# Patient Record
Sex: FEMALE | Race: WHITE | NOT HISPANIC OR LATINO | Employment: FULL TIME | URBAN - METROPOLITAN AREA
[De-identification: names, ages, dates, MRNs, and addresses within clinical notes are randomized per-mention and may not be internally consistent; named-entity substitution may affect disease eponyms.]

---

## 2023-07-20 ENCOUNTER — HOSPITAL ENCOUNTER (EMERGENCY)
Facility: HOSPITAL | Age: 43
Discharge: HOME | End: 2023-07-21
Attending: EMERGENCY MEDICINE
Payer: COMMERCIAL

## 2023-07-20 ENCOUNTER — APPOINTMENT (EMERGENCY)
Dept: CT IMAGING | Facility: HOSPITAL | Age: 43
End: 2023-07-20
Payer: COMMERCIAL

## 2023-07-20 DIAGNOSIS — N13.30 BILATERAL HYDRONEPHROSIS: Primary | ICD-10-CM

## 2023-07-20 DIAGNOSIS — N20.1 RIGHT URETERAL STONE: ICD-10-CM

## 2023-07-20 DIAGNOSIS — A41.9 SEPSIS (HCC): ICD-10-CM

## 2023-07-20 DIAGNOSIS — N12 PYELONEPHRITIS: ICD-10-CM

## 2023-07-20 LAB
ALBUMIN SERPL BCP-MCNC: 4.2 G/DL (ref 3.5–5)
ALP SERPL-CCNC: 51 U/L (ref 34–104)
ALT SERPL W P-5'-P-CCNC: 35 U/L (ref 7–52)
ANION GAP SERPL CALCULATED.3IONS-SCNC: 7 MMOL/L
AST SERPL W P-5'-P-CCNC: 42 U/L (ref 13–39)
BACTERIA UR QL AUTO: ABNORMAL /HPF
BASOPHILS # BLD AUTO: 0.04 THOUSANDS/ÂΜL (ref 0–0.1)
BASOPHILS NFR BLD AUTO: 0 % (ref 0–1)
BILIRUB DIRECT SERPL-MCNC: 0.23 MG/DL (ref 0–0.2)
BILIRUB SERPL-MCNC: 1.1 MG/DL (ref 0.2–1)
BILIRUB UR QL STRIP: NEGATIVE
BUDDING YEAST: PRESENT
BUN SERPL-MCNC: 9 MG/DL (ref 5–25)
CALCIUM SERPL-MCNC: 9.8 MG/DL (ref 8.4–10.2)
CHLORIDE SERPL-SCNC: 102 MMOL/L (ref 96–108)
CLARITY UR: ABNORMAL
CO2 SERPL-SCNC: 24 MMOL/L (ref 21–32)
COLOR UR: YELLOW
CREAT SERPL-MCNC: 0.8 MG/DL (ref 0.6–1.3)
EOSINOPHIL # BLD AUTO: 0.01 THOUSAND/ÂΜL (ref 0–0.61)
EOSINOPHIL NFR BLD AUTO: 0 % (ref 0–6)
ERYTHROCYTE [DISTWIDTH] IN BLOOD BY AUTOMATED COUNT: 12 % (ref 11.6–15.1)
GFR SERPL CREATININE-BSD FRML MDRD: 90 ML/MIN/1.73SQ M
GLUCOSE SERPL-MCNC: 126 MG/DL (ref 65–140)
GLUCOSE UR STRIP-MCNC: NEGATIVE MG/DL
HCG SERPL QL: NEGATIVE
HCT VFR BLD AUTO: 40.3 % (ref 34.8–46.1)
HGB BLD-MCNC: 13.9 G/DL (ref 11.5–15.4)
HGB UR QL STRIP.AUTO: ABNORMAL
IMM GRANULOCYTES # BLD AUTO: 0.11 THOUSAND/UL (ref 0–0.2)
IMM GRANULOCYTES NFR BLD AUTO: 1 % (ref 0–2)
KETONES UR STRIP-MCNC: NEGATIVE MG/DL
LACTATE SERPL-SCNC: 0.6 MMOL/L (ref 0.5–2)
LEUKOCYTE ESTERASE UR QL STRIP: ABNORMAL
LIPASE SERPL-CCNC: 7 U/L (ref 11–82)
LYMPHOCYTES # BLD AUTO: 0.68 THOUSANDS/ÂΜL (ref 0.6–4.47)
LYMPHOCYTES NFR BLD AUTO: 4 % (ref 14–44)
MAGNESIUM SERPL-MCNC: 1.6 MG/DL (ref 1.9–2.7)
MCH RBC QN AUTO: 30.8 PG (ref 26.8–34.3)
MCHC RBC AUTO-ENTMCNC: 34.5 G/DL (ref 31.4–37.4)
MCV RBC AUTO: 89 FL (ref 82–98)
MONOCYTES # BLD AUTO: 1.51 THOUSAND/ÂΜL (ref 0.17–1.22)
MONOCYTES NFR BLD AUTO: 9 % (ref 4–12)
MUCOUS THREADS UR QL AUTO: ABNORMAL
NEUTROPHILS # BLD AUTO: 14.76 THOUSANDS/ÂΜL (ref 1.85–7.62)
NEUTS SEG NFR BLD AUTO: 86 % (ref 43–75)
NITRITE UR QL STRIP: POSITIVE
NON-SQ EPI CELLS URNS QL MICRO: ABNORMAL /HPF
NRBC BLD AUTO-RTO: 0 /100 WBCS
PH UR STRIP.AUTO: 6 [PH]
PLATELET # BLD AUTO: 229 THOUSANDS/UL (ref 149–390)
PMV BLD AUTO: 8.9 FL (ref 8.9–12.7)
POTASSIUM SERPL-SCNC: 3.8 MMOL/L (ref 3.5–5.3)
PROCALCITONIN SERPL-MCNC: 0.23 NG/ML
PROT SERPL-MCNC: 7.3 G/DL (ref 6.4–8.4)
PROT UR STRIP-MCNC: ABNORMAL MG/DL
RBC # BLD AUTO: 4.52 MILLION/UL (ref 3.81–5.12)
RBC #/AREA URNS AUTO: ABNORMAL /HPF
SODIUM SERPL-SCNC: 133 MMOL/L (ref 135–147)
SP GR UR STRIP.AUTO: 1.01 (ref 1–1.03)
UROBILINOGEN UR STRIP-ACNC: <2 MG/DL
WBC # BLD AUTO: 17.11 THOUSAND/UL (ref 4.31–10.16)
WBC #/AREA URNS AUTO: ABNORMAL /HPF

## 2023-07-20 PROCEDURE — 96375 TX/PRO/DX INJ NEW DRUG ADDON: CPT

## 2023-07-20 PROCEDURE — 87040 BLOOD CULTURE FOR BACTERIA: CPT | Performed by: EMERGENCY MEDICINE

## 2023-07-20 PROCEDURE — 80076 HEPATIC FUNCTION PANEL: CPT | Performed by: EMERGENCY MEDICINE

## 2023-07-20 PROCEDURE — 84145 PROCALCITONIN (PCT): CPT | Performed by: EMERGENCY MEDICINE

## 2023-07-20 PROCEDURE — G1004 CDSM NDSC: HCPCS

## 2023-07-20 PROCEDURE — 96368 THER/DIAG CONCURRENT INF: CPT

## 2023-07-20 PROCEDURE — 74177 CT ABD & PELVIS W/CONTRAST: CPT

## 2023-07-20 PROCEDURE — 83690 ASSAY OF LIPASE: CPT | Performed by: EMERGENCY MEDICINE

## 2023-07-20 PROCEDURE — 36415 COLL VENOUS BLD VENIPUNCTURE: CPT | Performed by: EMERGENCY MEDICINE

## 2023-07-20 PROCEDURE — 80048 BASIC METABOLIC PNL TOTAL CA: CPT | Performed by: EMERGENCY MEDICINE

## 2023-07-20 PROCEDURE — 81001 URINALYSIS AUTO W/SCOPE: CPT | Performed by: EMERGENCY MEDICINE

## 2023-07-20 PROCEDURE — 96361 HYDRATE IV INFUSION ADD-ON: CPT

## 2023-07-20 PROCEDURE — 83735 ASSAY OF MAGNESIUM: CPT | Performed by: EMERGENCY MEDICINE

## 2023-07-20 PROCEDURE — 96365 THER/PROPH/DIAG IV INF INIT: CPT

## 2023-07-20 PROCEDURE — 87186 SC STD MICRODIL/AGAR DIL: CPT | Performed by: EMERGENCY MEDICINE

## 2023-07-20 PROCEDURE — 87077 CULTURE AEROBIC IDENTIFY: CPT | Performed by: EMERGENCY MEDICINE

## 2023-07-20 PROCEDURE — 99284 EMERGENCY DEPT VISIT MOD MDM: CPT

## 2023-07-20 PROCEDURE — 84703 CHORIONIC GONADOTROPIN ASSAY: CPT | Performed by: EMERGENCY MEDICINE

## 2023-07-20 PROCEDURE — 85025 COMPLETE CBC W/AUTO DIFF WBC: CPT | Performed by: EMERGENCY MEDICINE

## 2023-07-20 PROCEDURE — 83605 ASSAY OF LACTIC ACID: CPT | Performed by: EMERGENCY MEDICINE

## 2023-07-20 PROCEDURE — 87086 URINE CULTURE/COLONY COUNT: CPT | Performed by: EMERGENCY MEDICINE

## 2023-07-20 RX ORDER — ACETAMINOPHEN 325 MG/1
650 TABLET ORAL ONCE
Status: COMPLETED | OUTPATIENT
Start: 2023-07-20 | End: 2023-07-20

## 2023-07-20 RX ORDER — ONDANSETRON 2 MG/ML
4 INJECTION INTRAMUSCULAR; INTRAVENOUS ONCE
Status: COMPLETED | OUTPATIENT
Start: 2023-07-20 | End: 2023-07-20

## 2023-07-20 RX ORDER — KETOROLAC TROMETHAMINE 30 MG/ML
15 INJECTION, SOLUTION INTRAMUSCULAR; INTRAVENOUS ONCE
Status: COMPLETED | OUTPATIENT
Start: 2023-07-20 | End: 2023-07-20

## 2023-07-20 RX ORDER — MAGNESIUM SULFATE HEPTAHYDRATE 40 MG/ML
2 INJECTION, SOLUTION INTRAVENOUS ONCE
Status: COMPLETED | OUTPATIENT
Start: 2023-07-20 | End: 2023-07-21

## 2023-07-20 RX ORDER — CEFTRIAXONE 1 G/50ML
1000 INJECTION, SOLUTION INTRAVENOUS ONCE
Status: COMPLETED | OUTPATIENT
Start: 2023-07-20 | End: 2023-07-20

## 2023-07-20 RX ADMIN — SODIUM CHLORIDE 1000 ML: 0.9 INJECTION, SOLUTION INTRAVENOUS at 22:32

## 2023-07-20 RX ADMIN — ACETAMINOPHEN 650 MG: 325 TABLET ORAL at 23:00

## 2023-07-20 RX ADMIN — CEFTRIAXONE 1000 MG: 1 INJECTION, SOLUTION INTRAVENOUS at 23:12

## 2023-07-20 RX ADMIN — KETOROLAC TROMETHAMINE 15 MG: 30 INJECTION, SOLUTION INTRAMUSCULAR at 23:03

## 2023-07-20 RX ADMIN — IOHEXOL 100 ML: 350 INJECTION, SOLUTION INTRAVENOUS at 23:55

## 2023-07-20 RX ADMIN — ONDANSETRON 4 MG: 2 INJECTION INTRAMUSCULAR; INTRAVENOUS at 23:00

## 2023-07-20 RX ADMIN — MAGNESIUM SULFATE HEPTAHYDRATE 2 G: 40 INJECTION, SOLUTION INTRAVENOUS at 23:12

## 2023-07-21 ENCOUNTER — APPOINTMENT (OUTPATIENT)
Dept: RADIOLOGY | Facility: HOSPITAL | Age: 43
DRG: 854 | End: 2023-07-21
Payer: COMMERCIAL

## 2023-07-21 ENCOUNTER — TELEPHONE (OUTPATIENT)
Dept: UROLOGY | Facility: CLINIC | Age: 43
End: 2023-07-21

## 2023-07-21 ENCOUNTER — PREP FOR PROCEDURE (OUTPATIENT)
Dept: UROLOGY | Facility: CLINIC | Age: 43
End: 2023-07-21

## 2023-07-21 ENCOUNTER — ANESTHESIA EVENT (OUTPATIENT)
Dept: PERIOP | Facility: HOSPITAL | Age: 43
DRG: 854 | End: 2023-07-21
Payer: COMMERCIAL

## 2023-07-21 ENCOUNTER — ANESTHESIA (OUTPATIENT)
Dept: PERIOP | Facility: HOSPITAL | Age: 43
DRG: 854 | End: 2023-07-21
Payer: COMMERCIAL

## 2023-07-21 ENCOUNTER — HOSPITAL ENCOUNTER (INPATIENT)
Facility: HOSPITAL | Age: 43
LOS: 2 days | Discharge: HOME | DRG: 854 | End: 2023-07-23
Attending: INTERNAL MEDICINE | Admitting: STUDENT IN AN ORGANIZED HEALTH CARE EDUCATION/TRAINING PROGRAM
Payer: COMMERCIAL

## 2023-07-21 VITALS
OXYGEN SATURATION: 96 % | RESPIRATION RATE: 18 BRPM | BODY MASS INDEX: 31.15 KG/M2 | HEART RATE: 82 BPM | HEIGHT: 61 IN | WEIGHT: 165 LBS | DIASTOLIC BLOOD PRESSURE: 58 MMHG | TEMPERATURE: 98.8 F | SYSTOLIC BLOOD PRESSURE: 125 MMHG

## 2023-07-21 DIAGNOSIS — N12 PYELONEPHRITIS: Primary | ICD-10-CM

## 2023-07-21 DIAGNOSIS — N20.1 URETERAL STONE: Primary | ICD-10-CM

## 2023-07-21 DIAGNOSIS — A41.9 SEPSIS (HCC): ICD-10-CM

## 2023-07-21 DIAGNOSIS — N20.1 URETERAL CALCULUS: ICD-10-CM

## 2023-07-21 LAB
ANION GAP SERPL CALCULATED.3IONS-SCNC: 7 MMOL/L
BASOPHILS # BLD AUTO: 0.03 THOUSANDS/ÂΜL (ref 0–0.1)
BASOPHILS NFR BLD AUTO: 0 % (ref 0–1)
BUN SERPL-MCNC: 8 MG/DL (ref 5–25)
CALCIUM SERPL-MCNC: 8 MG/DL (ref 8.4–10.2)
CHLORIDE SERPL-SCNC: 109 MMOL/L (ref 96–108)
CO2 SERPL-SCNC: 22 MMOL/L (ref 21–32)
CREAT SERPL-MCNC: 0.66 MG/DL (ref 0.6–1.3)
EOSINOPHIL # BLD AUTO: 0.01 THOUSAND/ÂΜL (ref 0–0.61)
EOSINOPHIL NFR BLD AUTO: 0 % (ref 0–6)
ERYTHROCYTE [DISTWIDTH] IN BLOOD BY AUTOMATED COUNT: 12.2 % (ref 11.6–15.1)
GFR SERPL CREATININE-BSD FRML MDRD: 108 ML/MIN/1.73SQ M
GLUCOSE P FAST SERPL-MCNC: 118 MG/DL (ref 65–99)
GLUCOSE SERPL-MCNC: 118 MG/DL (ref 65–140)
HCT VFR BLD AUTO: 36.8 % (ref 34.8–46.1)
HGB BLD-MCNC: 12.4 G/DL (ref 11.5–15.4)
IMM GRANULOCYTES # BLD AUTO: 0.07 THOUSAND/UL (ref 0–0.2)
IMM GRANULOCYTES NFR BLD AUTO: 1 % (ref 0–2)
LYMPHOCYTES # BLD AUTO: 0.43 THOUSANDS/ÂΜL (ref 0.6–4.47)
LYMPHOCYTES NFR BLD AUTO: 3 % (ref 14–44)
MCH RBC QN AUTO: 30.6 PG (ref 26.8–34.3)
MCHC RBC AUTO-ENTMCNC: 33.7 G/DL (ref 31.4–37.4)
MCV RBC AUTO: 91 FL (ref 82–98)
MONOCYTES # BLD AUTO: 0.58 THOUSAND/ÂΜL (ref 0.17–1.22)
MONOCYTES NFR BLD AUTO: 4 % (ref 4–12)
NEUTROPHILS # BLD AUTO: 12.29 THOUSANDS/ÂΜL (ref 1.85–7.62)
NEUTS SEG NFR BLD AUTO: 92 % (ref 43–75)
NRBC BLD AUTO-RTO: 0 /100 WBCS
PLATELET # BLD AUTO: 166 THOUSANDS/UL (ref 149–390)
PMV BLD AUTO: 8.7 FL (ref 8.9–12.7)
POTASSIUM SERPL-SCNC: 3.5 MMOL/L (ref 3.5–5.3)
RBC # BLD AUTO: 4.05 MILLION/UL (ref 3.81–5.12)
SODIUM SERPL-SCNC: 138 MMOL/L (ref 135–147)
WBC # BLD AUTO: 13.41 THOUSAND/UL (ref 4.31–10.16)

## 2023-07-21 PROCEDURE — 85025 COMPLETE CBC W/AUTO DIFF WBC: CPT | Performed by: PHYSICIAN ASSISTANT

## 2023-07-21 PROCEDURE — 99285 EMERGENCY DEPT VISIT HI MDM: CPT | Performed by: EMERGENCY MEDICINE

## 2023-07-21 PROCEDURE — BT141ZZ FLUOROSCOPY OF KIDNEYS, URETERS AND BLADDER USING LOW OSMOLAR CONTRAST: ICD-10-PCS | Performed by: UROLOGY

## 2023-07-21 PROCEDURE — 74420 UROGRAPHY RTRGR +-KUB: CPT

## 2023-07-21 PROCEDURE — 0T788DZ DILATION OF BILATERAL URETERS WITH INTRALUMINAL DEVICE, VIA NATURAL OR ARTIFICIAL OPENING ENDOSCOPIC: ICD-10-PCS | Performed by: UROLOGY

## 2023-07-21 PROCEDURE — 80048 BASIC METABOLIC PNL TOTAL CA: CPT | Performed by: PHYSICIAN ASSISTANT

## 2023-07-21 PROCEDURE — C2617 STENT, NON-COR, TEM W/O DEL: HCPCS | Performed by: UROLOGY

## 2023-07-21 PROCEDURE — 99222 1ST HOSP IP/OBS MODERATE 55: CPT | Performed by: NURSE PRACTITIONER

## 2023-07-21 PROCEDURE — C1769 GUIDE WIRE: HCPCS | Performed by: UROLOGY

## 2023-07-21 PROCEDURE — 52332 CYSTOSCOPY AND TREATMENT: CPT | Performed by: UROLOGY

## 2023-07-21 PROCEDURE — 99223 1ST HOSP IP/OBS HIGH 75: CPT | Performed by: UROLOGY

## 2023-07-21 PROCEDURE — 96366 THER/PROPH/DIAG IV INF ADDON: CPT

## 2023-07-21 PROCEDURE — 96361 HYDRATE IV INFUSION ADD-ON: CPT

## 2023-07-21 PROCEDURE — C1758 CATHETER, URETERAL: HCPCS | Performed by: UROLOGY

## 2023-07-21 DEVICE — INLAY OPTIMA URETERAL STENT W/O GUIDEWIRE
Type: IMPLANTABLE DEVICE | Site: URETER | Status: FUNCTIONAL
Brand: BARD® INLAY OPTIMA® URETERAL STENT

## 2023-07-21 RX ORDER — SODIUM CHLORIDE, SODIUM LACTATE, POTASSIUM CHLORIDE, CALCIUM CHLORIDE 600; 310; 30; 20 MG/100ML; MG/100ML; MG/100ML; MG/100ML
INJECTION, SOLUTION INTRAVENOUS CONTINUOUS PRN
Status: DISCONTINUED | OUTPATIENT
Start: 2023-07-21 | End: 2023-07-21

## 2023-07-21 RX ORDER — DEXAMETHASONE SODIUM PHOSPHATE 10 MG/ML
INJECTION, SOLUTION INTRAMUSCULAR; INTRAVENOUS AS NEEDED
Status: DISCONTINUED | OUTPATIENT
Start: 2023-07-21 | End: 2023-07-21

## 2023-07-21 RX ORDER — LEVOFLOXACIN 5 MG/ML
500 INJECTION, SOLUTION INTRAVENOUS ONCE
Status: CANCELLED | OUTPATIENT
Start: 2023-07-21 | End: 2023-07-21

## 2023-07-21 RX ORDER — MIDAZOLAM HYDROCHLORIDE 2 MG/2ML
INJECTION, SOLUTION INTRAMUSCULAR; INTRAVENOUS AS NEEDED
Status: DISCONTINUED | OUTPATIENT
Start: 2023-07-21 | End: 2023-07-21

## 2023-07-21 RX ORDER — MAGNESIUM HYDROXIDE 1200 MG/15ML
LIQUID ORAL AS NEEDED
Status: DISCONTINUED | OUTPATIENT
Start: 2023-07-21 | End: 2023-07-21 | Stop reason: HOSPADM

## 2023-07-21 RX ORDER — SODIUM CHLORIDE, SODIUM LACTATE, POTASSIUM CHLORIDE, CALCIUM CHLORIDE 600; 310; 30; 20 MG/100ML; MG/100ML; MG/100ML; MG/100ML
75 INJECTION, SOLUTION INTRAVENOUS CONTINUOUS
Status: DISCONTINUED | OUTPATIENT
Start: 2023-07-21 | End: 2023-07-21

## 2023-07-21 RX ORDER — OXYCODONE HYDROCHLORIDE 5 MG/1
5 TABLET ORAL EVERY 6 HOURS PRN
Status: DISCONTINUED | OUTPATIENT
Start: 2023-07-21 | End: 2023-07-21

## 2023-07-21 RX ORDER — CEFAZOLIN SODIUM 2 G/50ML
2000 SOLUTION INTRAVENOUS ONCE
Status: COMPLETED | OUTPATIENT
Start: 2023-07-21 | End: 2023-07-21

## 2023-07-21 RX ORDER — ONDANSETRON 2 MG/ML
4 INJECTION INTRAMUSCULAR; INTRAVENOUS ONCE AS NEEDED
Status: DISCONTINUED | OUTPATIENT
Start: 2023-07-21 | End: 2023-07-21 | Stop reason: HOSPADM

## 2023-07-21 RX ORDER — ONDANSETRON 2 MG/ML
INJECTION INTRAMUSCULAR; INTRAVENOUS AS NEEDED
Status: DISCONTINUED | OUTPATIENT
Start: 2023-07-21 | End: 2023-07-21

## 2023-07-21 RX ORDER — FENTANYL CITRATE/PF 50 MCG/ML
25 SYRINGE (ML) INJECTION
Status: DISCONTINUED | OUTPATIENT
Start: 2023-07-21 | End: 2023-07-21 | Stop reason: HOSPADM

## 2023-07-21 RX ORDER — HYDROMORPHONE HCL/PF 1 MG/ML
0.5 SYRINGE (ML) INJECTION EVERY 4 HOURS PRN
Status: DISCONTINUED | OUTPATIENT
Start: 2023-07-21 | End: 2023-07-23 | Stop reason: HOSPADM

## 2023-07-21 RX ORDER — KETOROLAC TROMETHAMINE 30 MG/ML
15 INJECTION, SOLUTION INTRAMUSCULAR; INTRAVENOUS EVERY 6 HOURS PRN
Status: DISCONTINUED | OUTPATIENT
Start: 2023-07-21 | End: 2023-07-22

## 2023-07-21 RX ORDER — LIDOCAINE HYDROCHLORIDE 10 MG/ML
INJECTION, SOLUTION EPIDURAL; INFILTRATION; INTRACAUDAL; PERINEURAL AS NEEDED
Status: DISCONTINUED | OUTPATIENT
Start: 2023-07-21 | End: 2023-07-21

## 2023-07-21 RX ORDER — PROPOFOL 10 MG/ML
INJECTION, EMULSION INTRAVENOUS AS NEEDED
Status: DISCONTINUED | OUTPATIENT
Start: 2023-07-21 | End: 2023-07-21

## 2023-07-21 RX ORDER — OXYCODONE HYDROCHLORIDE 5 MG/1
5 TABLET ORAL EVERY 6 HOURS PRN
Status: DISCONTINUED | OUTPATIENT
Start: 2023-07-21 | End: 2023-07-22

## 2023-07-21 RX ORDER — SUCCINYLCHOLINE/SOD CL,ISO/PF 100 MG/5ML
SYRINGE (ML) INTRAVENOUS AS NEEDED
Status: DISCONTINUED | OUTPATIENT
Start: 2023-07-21 | End: 2023-07-21

## 2023-07-21 RX ORDER — LEVOFLOXACIN 5 MG/ML
500 INJECTION, SOLUTION INTRAVENOUS ONCE
Status: DISCONTINUED | OUTPATIENT
Start: 2023-07-21 | End: 2023-07-21

## 2023-07-21 RX ORDER — SPIRONOLACTONE 50 MG/1
1 TABLET, FILM COATED ORAL 2 TIMES DAILY
COMMUNITY
Start: 2023-06-16

## 2023-07-21 RX ORDER — OXYCODONE HYDROCHLORIDE 10 MG/1
10 TABLET ORAL EVERY 6 HOURS PRN
Status: DISCONTINUED | OUTPATIENT
Start: 2023-07-21 | End: 2023-07-21

## 2023-07-21 RX ORDER — ONDANSETRON 2 MG/ML
4 INJECTION INTRAMUSCULAR; INTRAVENOUS EVERY 6 HOURS PRN
Status: DISCONTINUED | OUTPATIENT
Start: 2023-07-21 | End: 2023-07-23 | Stop reason: HOSPADM

## 2023-07-21 RX ORDER — SODIUM CHLORIDE 9 MG/ML
75 INJECTION, SOLUTION INTRAVENOUS CONTINUOUS
Status: DISPENSED | OUTPATIENT
Start: 2023-07-21 | End: 2023-07-22

## 2023-07-21 RX ORDER — ACETAMINOPHEN 325 MG/1
650 TABLET ORAL EVERY 6 HOURS PRN
Status: DISCONTINUED | OUTPATIENT
Start: 2023-07-21 | End: 2023-07-23 | Stop reason: HOSPADM

## 2023-07-21 RX ORDER — SODIUM CHLORIDE 9 MG/ML
125 INJECTION, SOLUTION INTRAVENOUS CONTINUOUS
Status: DISCONTINUED | OUTPATIENT
Start: 2023-07-21 | End: 2023-07-21 | Stop reason: HOSPADM

## 2023-07-21 RX ORDER — HEPARIN SODIUM 5000 [USP'U]/ML
5000 INJECTION, SOLUTION INTRAVENOUS; SUBCUTANEOUS EVERY 8 HOURS SCHEDULED
Status: DISCONTINUED | OUTPATIENT
Start: 2023-07-21 | End: 2023-07-22

## 2023-07-21 RX ORDER — SODIUM CHLORIDE 9 MG/ML
125 INJECTION, SOLUTION INTRAVENOUS CONTINUOUS
Status: DISCONTINUED | OUTPATIENT
Start: 2023-07-21 | End: 2023-07-21

## 2023-07-21 RX ORDER — FENTANYL CITRATE 50 UG/ML
INJECTION, SOLUTION INTRAMUSCULAR; INTRAVENOUS AS NEEDED
Status: DISCONTINUED | OUTPATIENT
Start: 2023-07-21 | End: 2023-07-21

## 2023-07-21 RX ADMIN — SODIUM CHLORIDE 75 ML/HR: 0.9 INJECTION, SOLUTION INTRAVENOUS at 17:29

## 2023-07-21 RX ADMIN — HYDROMORPHONE HYDROCHLORIDE 0.5 MG: 1 INJECTION, SOLUTION INTRAMUSCULAR; INTRAVENOUS; SUBCUTANEOUS at 15:24

## 2023-07-21 RX ADMIN — ONDANSETRON 4 MG: 2 INJECTION INTRAMUSCULAR; INTRAVENOUS at 04:50

## 2023-07-21 RX ADMIN — DEXAMETHASONE SODIUM PHOSPHATE 10 MG: 10 INJECTION, SOLUTION INTRAMUSCULAR; INTRAVENOUS at 07:36

## 2023-07-21 RX ADMIN — SODIUM CHLORIDE 125 ML/HR: 0.9 INJECTION, SOLUTION INTRAVENOUS at 01:18

## 2023-07-21 RX ADMIN — MIDAZOLAM 2 MG: 1 INJECTION INTRAMUSCULAR; INTRAVENOUS at 07:20

## 2023-07-21 RX ADMIN — Medication 100 MG: at 07:23

## 2023-07-21 RX ADMIN — FENTANYL CITRATE 50 MCG: 50 INJECTION INTRAMUSCULAR; INTRAVENOUS at 07:23

## 2023-07-21 RX ADMIN — CEFTRIAXONE 1000 MG: 2 INJECTION, POWDER, FOR SOLUTION INTRAMUSCULAR; INTRAVENOUS at 23:03

## 2023-07-21 RX ADMIN — ONDANSETRON 4 MG: 2 INJECTION INTRAMUSCULAR; INTRAVENOUS at 07:42

## 2023-07-21 RX ADMIN — OXYCODONE HYDROCHLORIDE 5 MG: 5 TABLET ORAL at 20:34

## 2023-07-21 RX ADMIN — PROPOFOL 150 MG: 10 INJECTION, EMULSION INTRAVENOUS at 07:23

## 2023-07-21 RX ADMIN — HEPARIN SODIUM 5000 UNITS: 5000 INJECTION INTRAVENOUS; SUBCUTANEOUS at 22:58

## 2023-07-21 RX ADMIN — CEFAZOLIN SODIUM 2000 MG: 2 SOLUTION INTRAVENOUS at 11:25

## 2023-07-21 RX ADMIN — SODIUM CHLORIDE 125 ML/HR: 0.9 INJECTION, SOLUTION INTRAVENOUS at 04:32

## 2023-07-21 RX ADMIN — HEPARIN SODIUM 5000 UNITS: 5000 INJECTION INTRAVENOUS; SUBCUTANEOUS at 17:29

## 2023-07-21 RX ADMIN — SODIUM CHLORIDE 125 ML/HR: 0.9 INJECTION, SOLUTION INTRAVENOUS at 01:17

## 2023-07-21 RX ADMIN — SODIUM CHLORIDE, SODIUM LACTATE, POTASSIUM CHLORIDE, AND CALCIUM CHLORIDE: .6; .31; .03; .02 INJECTION, SOLUTION INTRAVENOUS at 07:17

## 2023-07-21 RX ADMIN — FENTANYL CITRATE 50 MCG: 50 INJECTION INTRAMUSCULAR; INTRAVENOUS at 07:45

## 2023-07-21 RX ADMIN — OXYCODONE HYDROCHLORIDE 5 MG: 5 TABLET ORAL at 10:41

## 2023-07-21 RX ADMIN — LIDOCAINE HYDROCHLORIDE 50 MG: 10 INJECTION, SOLUTION EPIDURAL; INFILTRATION; INTRACAUDAL; PERINEURAL at 07:23

## 2023-07-21 NOTE — ED NOTES
Trans at 0300 with slets  Dr salas accepting  [phone] for report     Jsoh Salter, RN  07/21/23 2017

## 2023-07-21 NOTE — OP NOTE
OPERATIVE REPORT  PATIENT NAME: Jun Cosme    :  1980  MRN: [id number]  Pt Location: AN OR ROOM 01    SURGERY DATE: 2023    Surgeon(s) and Role:     * Ian Mejia MD - Primary    Preop Diagnosis:  Bilateral hydronephrosis [N13.30]  Bilateral pyelonephritis  Right ureteral stone        Post-Op Diagnosis Codes:     * Bilateral hydronephrosis [N13.30]  Bilateral pyelonephritis  Right ureteral stone    Procedure(s):  Bilateral - CYSTOSCOPY RETROGRADE PYELOGRAM WITH INSERTION STENT URETERAL bilateral    Specimen(s):  * No specimens in log *    Estimated Blood Loss:   Minimal    Drains:  Ureteral Internal Stent Left ureter 6 Fr. (Active)   Site Assessment [facility] 23 [id number]   Number of days: 0       Ureteral Internal Stent Right ureter 6 Fr. (Active)   Site Assessment [facility] 23 4728   Number of days: 0       Anesthesia Type:   General    Operative Indications:  Patient with a history of recurrent nephrolithiasis presented to the emergency room with fevers and bilateral flank pain with CT scan showing bilateral hydronephrosis with multifocal acute pyelonephritis with obvious urothelial enhancement bilaterally with a 3 mm mid right ureteral stone and a possible punctate left ureteral stone although on my interpretation of the imaging I did not see a left-sided stone but did appreciate hydronephrosis of the collecting system and ureter as well as urothelial enhancement. Operative Findings:  Bladder had injected appearance of mucosa consistent with cystitis. Right retrograde pyelogram did not show obvious filling defect and showed mild hydroureteronephrosis. Stent placed without pus  Left side showed mild hydronephrosis without filling defect. Stent placed without pus. Complications:   None    Procedure and Technique:  The patient was brought to the operating room. Anesthesia was induced. They were moved to dorsal lithotomy position. Antibiotics were confirmed.   A time-out was performed identifying the correct patient, site, and procedure. A rigid 25 [place] cystoscope was introduced into the bladder. The urethra was unremarkable. The bladder was quickly surveyed and without any significant abnormalities. A  film was taken which did not show evidence of stone. Attention was turned to the right ureteral orifice. A 5 [place] open-end catheter was used to intubate the ureteral orifice. A gentle retrograde pyeloureterogram was performed which showed a normal-appearing ureter with mild to moderate hydronephrosis without a filling defect. A Solo wire was passed through the open-ended catheter up the ureter into the right renal pelvis under fluoroscopic guidance. The 5 [place] open-ended catheter was passed up to the collecting system and measured to be approximately 25 cm and then removed. A 6x 26stent was placed over the wire under visual guidance in the bladder and fluoroscopic guidance proximally. Once seen to be in appropriate position the wire was removed and a good coil was seen in the upper pole of the collecting system on fluoroscopy and visually in the bladder. The stent string was not left on. No pus was seen to drain after stent placement. Attention was now turned towards left side. In a similar fashion 500 and catheter was used to intubate the orifice and perform a retrograde which showed mild hydronephrosis. A wire was passed up to the collecting system and a 6x26 JJ stent was passed over the wire and after stent deployed good coil was seen in the collecting system on fluoroscopy and visually in the bladder. Pus was seen to drain from either side. The bladder was drained. This completed the case. The patient was woken from anesthesia and transferred to PACU in good condition. PLAN:  Patient will be arranged for outpatient Urology follow-up to set up definitive stone surgery once potential infection has cleared.      A qualified resident physician was not available.     Patient Disposition:  PACU         SIGNATURE: Paige Porter MD  DATE: July 21, 2023  TIME: 3:01 PM

## 2023-07-21 NOTE — ANESTHESIA POSTPROCEDURE EVALUATION
Post-Op Assessment Note    CV Status:  Stable    Pain management: adequate     Mental Status:  Awake and sleepy   PONV Controlled:  Controlled   Airway Patency:  Patent      Post Op Vitals Reviewed: Yes      Staff: CRNA         No notable events documented.     BP   104/57   Temp     Pulse 114   Resp   24   SpO2   95

## 2023-07-21 NOTE — CONSULTS
UROLOGY CONSULTATION NOTE     Patient Identifiers: Mercedes Smith (MRN [id number])  Service Requesting Consultation: ER  Service Providing Consultation:  Urology, Corazon Skinner MD    Date of Service: 7/21/2023  Inpatient consult to Urology  Consult performed by: Corazon Skinner MD  Consult ordered by: Tono Wright PA-C          Reason for Consultation: stones, hdyronephrosis and fever    History of Present Illness:     Mercedes Smith is a 36 y.o. old with a history of recurrent lithiasis presented to emergency room with bilateral flank pain fevers chills frequency and urgency. White count was 17.1 creatinine was 0.8      She met sepsis criteria based on tachycardia leukocytosis with likely prostatitis. CT scan showed bilateral hydronephrosis with 3 mm right proximal ureteral stone and a possible punctate left proximal ureteral stone. Past Medical, Past Surgical History:     Past Medical History:   Diagnosis Date   • Hydradenitis    :    Past Surgical History:   Procedure Laterality Date   • SKIN GRAFT     • TONSILLECTOMY     :    Medications, Allergies:     Current Facility-Administered Medications   Medication Dose Route Frequency   • [MAR Hold] acetaminophen (TYLENOL) tablet 650 mg  650 mg Oral Q6H PRN   • [MAR Hold] ceftriaxone (ROCEPHIN) 1 g/50 mL in dextrose IVPB  1,000 mg Intravenous Q24H   • [MAR Hold] heparin (porcine) subcutaneous injection 5,000 Units  5,000 Units Subcutaneous Q8H [address]   • [MAR Hold] HYDROmorphone (DILAUDID) injection 0.5 mg  0.5 mg Intravenous Q4H PRN   • [MAR Hold] ketorolac (TORADOL) injection 15 mg  15 mg Intravenous Q6H PRN   • [MAR Hold] ondansetron (ZOFRAN) injection 4 mg  4 mg Intravenous Q6H PRN   • [MAR Hold] oxyCODONE (ROXICODONE) IR tablet 5 mg  5 mg Oral Q6H PRN   • sodium chloride 0.9 % infusion  125 mL/hr Intravenous Continuous       Allergies:   Allergies   Allergen Reactions   • Amoxicillin Rash   :    Social and Family History:   Social History: Social History     Tobacco Use   • Smoking status: Never   • Smokeless tobacco: Never   Vaping Use   • Vaping Use: Never used   Substance Use Topics   • Alcohol use: Not Currently   • Drug use: Not Currently   . Social History     Tobacco Use   Smoking Status Never   Smokeless Tobacco Never       Family History:  History reviewed. No pertinent family history.:     Review of Systems:     General: Fever, chills, or night sweats: positive  Cardiac: Negative for chest pain. Pulmonary: Negative for shortness of breath. Gastrointestinal: Abdominal pain positive. Nausea, vomiting, or diarrhea negative,  Genitourinary: See HPI above. Patient does not have hematuria. All other systems queried were negative. Physical Exam:   General: Patient is pleasant and in NAD. Awake and alert  /55   Pulse (!) 107   Temp (!) 100.7 °F (38.2 °C) (Temporal)   Resp 16   Ht 5' 1" (1.549 m)   Wt 74.8 kg (165 lb)   SpO2 97%   BMI 31.18 kg/m² Temp (24hrs), Av.5 °F (37.5 °C), Min:98 °F (36.7 °C), Max:100.7 °F (38.2 °C)  current; Temperature: (!) 100.7 °F (38.2 °C)  No intake/output data recorded. Cardiac: Peripheral edema: negative  Pulmonary: Non-labored breathing  Abdomen: Soft, non-tender, non-distended. No surgical scars. No masses, tenderness, hernias noted. Genitourinary: Negative CVA tenderness, negative suprapubic tenderness.       Labs:     Lab Results   Component Value Date    HGB 12.4 2023    HCT 36.8 2023    WBC 13.41 (H) 2023     2023   ]    Lab Results   Component Value Date    K 3.5 2023     (H) 2023    CO2 22 2023    BUN 8 2023    CREATININE 0.66 2023    CALCIUM 8.0 (L) 2023   ]    Imaging:   I personally reviewed the images and report of the following studies, and reviewed them with the patient:    Ct: CT A/P shows 3 mm ureteral calculus within the proximal right ureter with questionable punctate calculus in the left proximal ureter. Associated mild bilateral hydroureter without significant hydronephrosis. Multifocal acute pyelonephritis      ASSESSMENT:     36 y.o. old female with recurrent nephrolithiasis with fever and possible b/l stones with infection and bilateral hydronephrosis. My personal interpretation of the of the CT scan is he has a right mid ureteral stone and I do not see a stone in the left but do see obvious urothelial enhancement and thickening likely from recently passed stone and current infection. PLAN:     Bilateral stent placement to be safe. Bilateral ureteroscopy with laser lithotripsy at a later date after infection cleared. Thank you for allowing me to participate in this patients’ care. Please do not hesitate to call with any additional questions. Dandre Beatty MD    Total time of encounter was 30 minutes, of which 25 minutes was spent on counseling.

## 2023-07-21 NOTE — SEPSIS NOTE
Sepsis Note   Davey Merino 36 y.o. female MRN: [id number]  Unit/Bed#: ED 13 Encounter: [id number]       Initial Sepsis Screening     Row Name 07/21/23 0131                Is the patient's history suggestive of a new or worsening infection? Yes (Proceed)  -MA        Suspected source of infection urinary tract infection  -MA        Indicate SIRS criteria Tachycardia > 90 bpm;Leukocytosis (WBC > 83712 IJL) OR Leukopenia (WBC <4000 IJL) OR Bandemia (WBC >10% bands)  -MA        Are two or more of the above signs & symptoms of infection both present and new to the patient? Yes (Proceed)  -MA        Assess for evidence of organ dysfunction: Are any of the below criteria present within 6 hours of suspected infection and SIRS criteria that are NOT considered to be chronic conditions? --  None  -MA              User Key  (r) = Recorded By, (t) = Taken By, (c) = Cosigned By    [address] [place] [address] Name Provider Type    RONALD Holloway DO Physician                    Body mass index is 31.18 kg/m².   Wt Readings from Last 1 Encounters:   07/20/23 74.8 kg (165 lb)     IBW (Ideal Body Weight): 47.8 kg    Ideal body weight: 47.8 kg (105 lb 6.1 oz)  Adjusted ideal body weight: 58.6 kg (129 lb 3.7 oz)

## 2023-07-21 NOTE — H&P
[facility]  H&P  Name: Linda Devries 36 y.o. female I MRN: [id number]  Unit/Bed#: W -01 I Date of Admission: 7/21/2023   Date of Service: 7/21/2023 I Hospital Day: 0      Assessment/Plan   * Sepsis ([address])  Assessment & Plan  · POA a/e/b tachycardia and leukocytosis  · Tmax 100.4F  · Source: multifocal acute pyelonephritis with bilateral ureteral calculi and bilateral hydroureter   · IV Ceftriaxone   · Plan for urological intervention in the AM for bilateral renal decompression via cystoscopy bilateral ureteral stent insertion  · Follow up urine and blood cultures   · Monitor fever curve/hemodynamics    Ureteral calculus  Assessment & Plan  · Bilateral ureteral stones, 3mm proximal right ureter and questionable punctate calculus in left proximal ureter with bilateral hydroureter and superimposed UTI and multifocal acute pyelonephritis   · NPO  · IVF   · PRN analgesia/antiemetics   · Urology planning for bilateral renal decompression via cystoscopy bilateral ureteral stent insertion this morning     Pyelonephritis  Assessment & Plan  · UA appears grossly infected  · CT A/P shows 3 mm ureteral calculus within the proximal right ureter with questionable punctate calculus in the left proximal ureter. Associated mild bilateral hydroureter without significant hydronephrosis. Multifocal acute pyelonephritis. · IV Ceftriaxone   · Follow up urine cx   · Urology following        VTE Pharmacologic Prophylaxis: VTE Score: 4 Moderate Risk (Score 3-4) - Pharmacological DVT Prophylaxis Ordered: heparin. Code Status: Level 1 - Full Code   Discussion with family: Patient declined call to . Anticipated Length of Stay: Patient will be admitted on an observation basis with an anticipated length of stay of less than 2 midnights secondary to ureteral calculus, pyelonephritis, sepsis.     Total Time Spent on Date of Encounter in care of patient: 75 minutes This time was spent on one or more of the following: performing physical exam; counseling and coordination of care; obtaining or reviewing history; documenting in the medical record; reviewing/ordering tests, medications or procedures; communicating with other healthcare professionals and discussing with patient's family/caregivers. Chief Complaint: Bilateral flank pain, fever, nausea    History of Present Illness:  Raj Alfaro is a 36 y.o. female with a PMH of kidney stones who originally presented to [address] [place] [address] with complaints of bilateral flank pain, fever, chills, urinary frequency and urgency. Patient reports that she passed a kidney stone 4 days ago with associated urinary frequency. Patient reports bilateral flank pain with associated nausea, vomiting, fever and chills. Upon evaluation in the [address] [place] [address] ED, patient met sepsis criteria based on tachycardia and leukocytosis with source being pyelonephritis. Additionally, patient was found to have bilateral ureteral stones; therefore, patient was transferred to [facility] for urological intervention this AM.    Upon evaluation, patient was rigoring and nausea. She denies pain at this time. Review of Systems:  Review of Systems   Constitutional: Positive for chills and fever. Respiratory: Negative for shortness of breath. Cardiovascular: Negative for chest pain. Gastrointestinal: Positive for nausea and vomiting. Negative for abdominal pain. Genitourinary: Positive for flank pain. All other systems reviewed and are negative. Past Medical and Surgical History:   No past medical history on file. No past surgical history on file. Meds/Allergies:  Prior to Admission medications    Not on File     I have reviewed home medications with patient personally. Allergies:    Allergies   Allergen Reactions   • Amoxicillin Rash       Social History:  Marital Status: /Civil Union   Occupation: Unknown  Patient Pre-hospital Living Situation: Home  Patient Pre-hospital Level of Mobility: walks  Patient Pre-hospital Diet Restrictions: None  Substance Use History:   Social History     Substance and Sexual Activity   Alcohol Use Not Currently     Social History     Tobacco Use   Smoking Status Never   Smokeless Tobacco Never     Social History     Substance and Sexual Activity   Drug Use Not Currently       Family History:  No family history on file. Physical Exam:     Vitals:   Blood Pressure: 117/74 (07/21/23 0407)  Pulse: (!) 122 (07/21/23 0457)  Temperature: 99.9 °F (37.7 °C) (07/21/23 0457)  SpO2: 97 % (07/21/23 0457)    Physical Exam  Vitals and nursing note reviewed. HENT:      Head: Normocephalic. Nose: Nose normal.      Mouth/Throat:      Pharynx: Oropharynx is clear. Eyes:      General: No scleral icterus. Conjunctiva/sclera: Conjunctivae normal.   Cardiovascular:      Rate and Rhythm: Regular rhythm. Tachycardia present. Pulses: Normal pulses. Heart sounds: Normal heart sounds. No murmur heard. Pulmonary:      Effort: Pulmonary effort is normal. No respiratory distress. Breath sounds: Normal breath sounds. No wheezing, rhonchi or rales. Chest:      Chest wall: No tenderness. Abdominal:      General: Bowel sounds are normal. There is no distension. Palpations: Abdomen is soft. Tenderness: There is no abdominal tenderness. Musculoskeletal:         General: No swelling or deformity. Normal range of motion. Cervical back: Normal range of motion. Skin:     General: Skin is warm and dry. Capillary Refill: Capillary refill takes 2 to 3 seconds. Neurological:      Mental Status: She is alert and oriented to person, place, and time.    Psychiatric:         Speech: Speech normal.          Additional Data:     Lab Results:  Results from last 7 days   Lab Units 07/20/23  2227   WBC Thousand/uL 17.11*   HEMOGLOBIN g/dL 13.9   HEMATOCRIT % 40.3   PLATELETS Thousands/uL 229   NEUTROS PCT % 86*   LYMPHS PCT % 4*   MONOS PCT % 9 EOS PCT % 0     Results from last 7 days   Lab Units 07/20/23  2227   SODIUM mmol/L 133*   POTASSIUM mmol/L 3.8   CHLORIDE mmol/L 102   CO2 mmol/L 24   BUN mg/dL 9   CREATININE mg/dL 0.80   ANION GAP mmol/L 7   CALCIUM mg/dL 9.8   ALBUMIN g/dL 4.2   TOTAL BILIRUBIN mg/dL 1.10*   ALK PHOS U/L 51   ALT U/L 35   AST U/L 42*   GLUCOSE RANDOM mg/dL 126                 Results from last 7 days   Lab Units 07/20/23  2227   LACTIC ACID mmol/L 0.6   PROCALCITONIN ng/ml 0.23       Lines/Drains:  Invasive Devices     Peripheral Intravenous Line  Duration           Peripheral IV 07/20/23 Left Hand <1 day    Peripheral IV 07/20/23 Right Antecubital <1 day                    Imaging: Personally reviewed the following imaging: abdominal/pelvic CT  No orders to display       EKG and Other Studies Reviewed on Admission:   · EKG: No EKG obtained. ** Please Note: This note has been constructed using a voice recognition system.  **

## 2023-07-21 NOTE — PLAN OF CARE
Problem: INFECTION - ADULT  Goal: Absence or prevention of progression during hospitalization  Description: INTERVENTIONS:  - Assess and monitor for signs and symptoms of infection  - Monitor lab/diagnostic results  - Monitor all insertion sites, i.e. indwelling lines, tubes, and drains  - Monitor endotracheal if appropriate and nasal secretions for changes in amount and color  - Motley appropriate cooling/warming therapies per order  - Administer medications as ordered  - Instruct and encourage patient and family to use good hand hygiene technique  - Identify and instruct in appropriate isolation precautions for identified infection/condition  Outcome: Progressing  Goal: Absence of fever/infection during neutropenic period  Description: INTERVENTIONS:  - Monitor WBC    Outcome: Progressing     Problem: SAFETY ADULT  Goal: Patient will remain free of falls  Description: INTERVENTIONS:  - Educate patient/family on patient safety including physical limitations  - Instruct patient to call for assistance with activity   - Consult OT/PT to assist with strengthening/mobility   - Keep Call bell within reach  - Keep bed low and locked with side rails adjusted as appropriate  - Keep care items and personal belongings within reach  - Initiate and maintain comfort rounds  - Make Fall Risk Sign visible to staff  - Apply yellow socks and bracelet for high fall risk patients  - Consider moving patient to room near nurses station  Outcome: Progressing  Goal: Maintain or return to baseline ADL function  Description: INTERVENTIONS:  -  Assess patient's ability to carry out ADLs; assess patient's baseline for ADL function and identify physical deficits which impact ability to perform ADLs (bathing, care of mouth/teeth, toileting, grooming, dressing, etc.)  - Assess/evaluate cause of self-care deficits   - Assess range of motion  - Assess patient's mobility; develop plan if impaired  - Assess patient's need for assistive devices and provide as appropriate  - Encourage maximum independence but intervene and supervise when necessary  - Involve family in performance of ADLs  - Assess for home care needs following discharge   - Consider OT consult to assist with ADL evaluation and planning for discharge  - Provide patient education as appropriate  Outcome: Progressing  Goal: Maintains/Returns to pre admission functional level  Description: INTERVENTIONS:  - Perform BMAT or MOVE assessment daily.   - Set and communicate daily mobility goal to care team and patient/family/caregiver.    - Collaborate with rehabilitation services on mobility goals if consulted  - Out of bed for toileting  - Record patient progress and toleration of activity level   Outcome: Progressing     Problem: GENITOURINARY - ADULT  Goal: Maintains or returns to baseline urinary function  Description: INTERVENTIONS:  - Assess urinary function  - Encourage oral fluids to ensure adequate hydration if ordered  - Administer IV fluids as ordered to ensure adequate hydration  - Administer ordered medications as needed  - Offer frequent toileting  - Follow urinary retention protocol if ordered  Outcome: Progressing  Goal: Absence of urinary retention  Description: INTERVENTIONS:  - Assess patient’s ability to void and empty bladder  - Monitor I/O  - Bladder scan as needed  - Discuss with physician/AP medications to alleviate retention as needed  - Discuss catheterization for long term situations as appropriate  Outcome: Progressing  Goal: Urinary catheter remains patent  Description: INTERVENTIONS:  - Assess patency of urinary catheter  - If patient has a chronic perez, consider changing catheter if non-functioning  - Follow guidelines for intermittent irrigation of non-functioning urinary catheter  Outcome: Progressing

## 2023-07-21 NOTE — ED PROVIDER NOTES
History  Chief Complaint   Patient presents with   • Flank Pain     Pt reports bilateral flank pain, fevers, chills, and urinary incontinence. HX of stones. Patient is a 50-year-old female with past medical history of for nephrolithiasis, presents to the emergency department for possible kidney infection versus kidney stone. Patient states that on Tuesday she passed a kidney stone and shortly after that she was having dysuria which has been persistent in addition to increased urinary frequency and urgency. She states she has been feeling lower abdominal cramping pain as well as bilateral flank pain, initially starting on the left but now it is both sides that started over the past 2 days. She reports having nausea and vomiting since yesterday as well as fevers and chills. She reports feeling generally unwell. She denies any diarrhea, constipation, gross hematuria, headache, dizziness or near syncope, cough, URI symptoms, chest pain, palpitations, dyspnea, abdominal distention, hematemesis, blood per rectum or melena, skin rash or color change, extremity weakness or paresthesia or other focal neurologic deficits. History provided by:  Patient   used: No    Flank Pain  Associated symptoms: chills, dysuria, fever, nausea and vomiting    Associated symptoms: no chest pain, no constipation, no cough, no diarrhea, no hematuria, no shortness of breath, no sore throat and no vaginal discharge        None       History reviewed. No pertinent past medical history. History reviewed. No pertinent surgical history. History reviewed. No pertinent family history. I have reviewed and agree with the history as documented.     E-Cigarette/Vaping   • E-Cigarette Use Never User      E-Cigarette/Vaping Substances     Social History     Tobacco Use   • Smoking status: Never   • Smokeless tobacco: Never   Vaping Use   • Vaping Use: Never used   Substance Use Topics   • Alcohol use: Not Currently • Drug use: Not Currently       Review of Systems   Constitutional: Positive for appetite change, chills and fever. HENT: Negative for congestion, ear pain, rhinorrhea and sore throat. Respiratory: Negative for cough, chest tightness, shortness of breath and wheezing. Cardiovascular: Negative for chest pain and palpitations. Gastrointestinal: Positive for abdominal pain, nausea and vomiting. Negative for abdominal distention, blood in stool, constipation and diarrhea. Genitourinary: Positive for dysuria, flank pain, frequency, pelvic pain and urgency. Negative for hematuria, vaginal discharge and vaginal pain. Musculoskeletal: Negative for back pain, neck pain and neck stiffness. Skin: Negative for color change, pallor, rash and wound. Allergic/Immunologic: Negative for immunocompromised state. Neurological: Negative for dizziness, syncope, weakness, light-headedness, numbness and headaches. Hematological: Negative for adenopathy. Psychiatric/Behavioral: Negative for confusion and decreased concentration. All other systems reviewed and are negative. Physical Exam  Physical Exam  Vitals and nursing note reviewed. Constitutional:       General: She is not in acute distress. Appearance: Normal appearance. She is well-developed. She is not ill-appearing, toxic-appearing or diaphoretic. HENT:      Head: Normocephalic and atraumatic. Right Ear: External ear normal.      Left Ear: External ear normal.      Mouth/Throat:      Mouth: Mucous membranes are moist.      Pharynx: Oropharynx is clear. Eyes:      Extraocular Movements: Extraocular movements intact. Conjunctiva/sclera: Conjunctivae normal.   Neck:      Vascular: No JVD. Cardiovascular:      Rate and Rhythm: Regular rhythm. Tachycardia present. Pulses: Normal pulses. Heart sounds: Normal heart sounds. No murmur heard. No friction rub. No gallop.    Pulmonary:      Effort: Pulmonary effort is normal. No respiratory distress. Breath sounds: Normal breath sounds. No wheezing or rales. Chest:      Chest wall: No tenderness. Abdominal:      General: There is no distension. Palpations: Abdomen is soft. Tenderness: There is abdominal tenderness. There is right CVA tenderness and left CVA tenderness. There is no guarding or rebound. Comments: +Significant bilateral CVA tenderness. +Tenderness in RUQ and RLQ. Musculoskeletal:         General: No swelling or tenderness. Normal range of motion. Cervical back: Normal range of motion and neck supple. No rigidity. Skin:     General: Skin is warm and dry. Coloration: Skin is not pale. Findings: No erythema or rash. Neurological:      General: No focal deficit present. Mental Status: She is alert and oriented to person, place, and time. Sensory: No sensory deficit. Motor: No weakness.    Psychiatric:         Mood and Affect: Mood normal.         Behavior: Behavior normal.         Vital Signs  ED Triage Vitals [07/20/23 2108]   Temperature Pulse Respirations Blood Pressure SpO2   99.1 °F (37.3 °C) (!) 123 20 120/72 97 %      Temp Source Heart Rate Source Patient Position - Orthostatic VS BP Location FiO2 (%)   Oral Monitor Sitting Left arm --      Pain Score       10 - Worst Possible Pain         Vitals:    07/20/23 2213 07/20/23 2345 07/21/23 0008 07/21/23 0100   BP:   111/58 125/58   BP Location:   Right arm Right arm   Pulse:  91 92 82   Resp:  19 18 18   Temp: 100.4 °F (38 °C)  98.8 °F (37.1 °C)    TempSrc: Oral  Oral    SpO2:  95% 95% 96%   Weight:       Height:           Visual Acuity      ED Medications  Medications   sodium chloride 0.9 % infusion (125 mL/hr Intravenous New Bag 7/21/23 0118)   sodium chloride 0.9 % bolus 1,000 mL (0 mL Intravenous Stopped 7/20/23 2354)   ondansetron (ZOFRAN) injection 4 mg (4 mg Intravenous Given 7/20/23 2300)   ketorolac (TORADOL) injection 15 mg (15 mg Intravenous Given 7/20/23 2303)   acetaminophen (TYLENOL) tablet 650 mg (650 mg Oral Given 7/20/23 2300)   cefTRIAXone (ROCEPHIN) IVPB (premix in dextrose) 1,000 mg 50 mL (0 mg Intravenous Stopped 7/20/23 2354)   magnesium sulfate 2 g/50 mL IVPB (premix) 2 g (0 g Intravenous Stopped 7/21/23 0106)   iohexol (OMNIPAQUE) 350 MG/ML injection (SINGLE-DOSE) 100 mL (100 mL Intravenous Given 7/20/23 2355)       Diagnostic Studies  Results Reviewed     Procedure Component Value Units Date/Time    Urine Microscopic [[id number]]  (Abnormal) Collected: 07/20/23 2305    Lab Status: Final result Specimen: Urine, Clean Catch Updated: 07/20/23 2318     RBC, UA 30-50 /hpf      WBC, UA Innumerable /hpf      Epithelial Cells Occasional /hpf      Bacteria, UA Innumerable /hpf      MUCUS THREADS Occasional     Budding Yeast Present    UA (URINE) with reflex to Scope [[id number]]  (Abnormal) Collected: 07/20/23 2305    Lab Status: Final result Specimen: Urine, Clean Catch Updated: 07/20/23 2315     Color, UA Yellow     Clarity, UA Extra Turbid     Specific Gravity, UA 1.015     pH, UA 6.0     Leukocytes, UA Large     Nitrite, UA Positive     Protein,  (2+) mg/dl      Glucose, UA Negative mg/dl      Ketones, UA Negative mg/dl      Urobilinogen, UA <2.0 mg/dl      Bilirubin, UA Negative     Occult Blood, UA Large    Procalcitonin [[id number]]  (Normal) Collected: 07/20/23 2227    Lab Status: Final result Specimen: Blood from Hand, Left Updated: 07/20/23 2309     Procalcitonin 0.23 ng/ml     hCG, qualitative pregnancy [[id number]]  (Normal) Collected: 07/20/23 2227    Lab Status: Final result Specimen: Blood from Hand, Left Updated: 07/20/23 2309     Preg, Serum Negative    Urine culture [[id number]] Collected: 07/20/23 2305    Lab Status:  In process Specimen: Urine, Clean Catch Updated: 07/20/23 2308    Lactic acid, plasma (w/reflex if result > 2.0) [[id number]]  (Normal) Collected: 07/20/23 2227    Lab Status: Final result Specimen: Blood from Hand, Left Updated: 07/20/23 2301     LACTIC ACID 0.6 mmol/L     Narrative:      Result may be elevated if tourniquet was used during collection.     Basic metabolic panel [[id number]]  (Abnormal) Collected: 07/20/23 2227    Lab Status: Final result Specimen: Blood from Hand, Left Updated: 07/20/23 2301     Sodium 133 mmol/L      Potassium 3.8 mmol/L      Chloride 102 mmol/L      CO2 24 mmol/L      ANION GAP 7 mmol/L      BUN 9 mg/dL      Creatinine 0.80 mg/dL      Glucose 126 mg/dL      Calcium 9.8 mg/dL      eGFR 90 ml/min/1.73sq m     Narrative:      [place] guidelines for Chronic Kidney Disease (CKD):   •  Stage 1 with normal or high GFR (GFR > 90 mL/min/1.73 square meters)  •  Stage 2 Mild CKD (GFR = 60-89 mL/min/1.73 square meters)  •  Stage 3A Moderate CKD (GFR = 45-59 mL/min/1.73 square meters)  •  Stage 3B Moderate CKD (GFR = 30-44 mL/min/1.73 square meters)  •  Stage 4 Severe CKD (GFR = 15-29 mL/min/1.73 square meters)  •  Stage 5 End Stage CKD (GFR <15 mL/min/1.73 square meters)  Note: GFR calculation is accurate only with a steady state creatinine    Hepatic function panel [[id number]]  (Abnormal) Collected: 07/20/23 2227    Lab Status: Final result Specimen: Blood from Hand, Left Updated: 07/20/23 2301     Total Bilirubin 1.10 mg/dL      Bilirubin, Direct 0.23 mg/dL      Alkaline Phosphatase 51 U/L      AST 42 U/L      ALT 35 U/L      Total Protein 7.3 g/dL      Albumin 4.2 g/dL     Lipase [[id number]]  (Abnormal) Collected: 07/20/23 2227    Lab Status: Final result Specimen: Blood from Hand, Left Updated: 07/20/23 2301     Lipase 7 u/L     Magnesium [[id number]]  (Abnormal) Collected: 07/20/23 2227    Lab Status: Final result Specimen: Blood from Hand, Left Updated: 07/20/23 2301     Magnesium 1.6 mg/dL     CBC and differential [[id number]]  (Abnormal) Collected: 07/20/23 2227    Lab Status: Final result Specimen: Blood from Hand, Left Updated: 07/20/23 8373     WBC 17.11 Thousand/uL      RBC 4.52 Million/uL      Hemoglobin 13.9 g/dL      Hematocrit 40.3 %      MCV 89 fL      MCH 30.8 pg      MCHC 34.5 g/dL      RDW 12.0 %      MPV 8.9 fL      Platelets 519 Thousands/uL      nRBC 0 /100 WBCs      Neutrophils Relative 86 %      Immat GRANS % 1 %      Lymphocytes Relative 4 %      Monocytes Relative 9 %      Eosinophils Relative 0 %      Basophils Relative 0 %      Neutrophils Absolute 14.76 Thousands/µL      Immature Grans Absolute 0.11 Thousand/uL      Lymphocytes Absolute 0.68 Thousands/µL      Monocytes Absolute 1.51 Thousand/µL      Eosinophils Absolute 0.01 Thousand/µL      Basophils Absolute 0.04 Thousands/µL     Blood culture #1 [[id number]] Collected: 07/20/23 2227    Lab Status: In process Specimen: Blood from Hand, Left Updated: 07/20/23 2239    Blood culture #2 [[id number]] Collected: 07/20/23 2230    Lab Status: In process Specimen: Blood from Arm, Right Updated: 07/20/23 2239                 CT abdomen pelvis with contrast   Final Result by Kvng Juárez MD (07/21 0110)      3 mm ureteral calculus within the proximal right ureter with questionable punctate calculus in the left proximal ureter (2:101). Associated mild bilateral hydroureter without significant hydronephrosis. Additional findings above suspicious for superimposed UTI and multifocal acute pyelonephritis. Clinical correlation and urologic consult advised. Additional right lower pole intrarenal calculus. Above findings discussed with Dr. Areli Goldstein at 1:05 a.m. on 7/21/2023. Workstation performed: [id number]                    Procedures  Procedures         ED Course  ED Course as of 07/21/23 0219   u Jul 20, 2023   2303 LACTIC ACID: 0.6   2303 Magnesium(!): 1.6  Will replace with IV mag sulfate 2g.   2316 Leukocytes, UA(!): Large   2316 Nitrite, UA(!): Positive   2316 Rocephin ordered. Fri Jul 21, 2023   0104 Spoke with radiologist about significant CT findings.   Will consult with urology but patient will require admission, possible transfer to [facility] or possibly IR nephrostomy tubes. 0111 Updated patient about significant findings and Tiger texted on-call urology AP, Darlene Gilmore. Sergio Mckeon spoke with urology attending, Dr. Hilda Hopson, who said since patient is sick and no available beds at [facility], he recommended urgent transfer to [facility] and he plans to take patient to OR at [facility]. Transfer order placed. Waiting to hear from PACS.    Deanne Luo at [facility] accepted patient. 4402 EMTALA signed. Patient signed out to Dr. Aston Alcazar at end of shift. Patient awaiting transfer to [facility]. Initial Sepsis Screening     Row Name 07/21/23 0131                Is the patient's history suggestive of a new or worsening infection? Yes (Proceed)  -MA        Suspected source of infection urinary tract infection  -MA        Indicate SIRS criteria Tachycardia > 90 bpm;Leukocytosis (WBC > 14228 IJL) OR Leukopenia (WBC <4000 IJL) OR Bandemia (WBC >10% bands)  -MA        Are two or more of the above signs & symptoms of infection both present and new to the patient? Yes (Proceed)  -MA        Assess for evidence of organ dysfunction: Are any of the below criteria present within 6 hours of suspected infection and SIRS criteria that are NOT considered to be chronic conditions? --  None  -MA              User Key  (r) = Recorded By, (t) = Taken By, (c) = Cosigned By    [address] [place] [address] Name Provider Type    RONALD Sharpe DO Physician                SBIRT 20yo+    Flowsheet Row Most Recent Value   Initial Alcohol Screen: US AUDIT-C     1. How often do you have a drink containing alcohol? 0 Filed at: 07/20/2023 2112   2. How many drinks containing alcohol do you have on a typical day you are drinking? 0 Filed at: 07/20/2023 2112   3a. Male UNDER 65: How often do you have five or more drinks on one occasion? 0 Filed at: 07/20/2023 2112   3b. FEMALE Any Age, or MALE 65+:  How often do you have 4 or more drinks on one occassion? 0 Filed at: 07/20/2023 2112   Audit-C Score 0 Filed at: 07/20/2023 2112   JOE: How many times in the past year have you. .. Used an illegal drug or used a prescription medication for non-medical reasons? Never Filed at: 07/20/2023 2112                    Medical Decision Making  41-year-old female presents to the ED for UTI symptoms, abdominal pain, flank pain, nausea, vomiting, fever and chills starting 2 days ago, shortly after passing a kidney stone on her own. Suspect UTI and pyelonephritis based on her symptoms. Infected obstructing stone also considered. Will work-up with abdominal and sepsis labs, CT abdomen and pelvis with IV contrast.  Will give IV fluids, Toradol, Tylenol for fever and pain, Zofran for nausea. Will start IV Rocephin for suspected infection once patient able to provide urine specimen. Amount and/or Complexity of Data Reviewed  Labs: ordered. Decision-making details documented in ED Course. Radiology: ordered. Decision-making details documented in ED Course. Risk  OTC drugs. Prescription drug management.           Disposition  Final diagnoses:   Bilateral hydronephrosis   Pyelonephritis - Bilateral   Right ureteral stone   Sepsis ([address])     Time reflects when diagnosis was documented in both MDM as applicable and the Disposition within this note     Time User Action Codes Description Comment    7/21/2023  1:23 AM Elta Lipa Add [N13.30] Bilateral hydronephrosis     7/21/2023  1:30 AM Scott Lies E Add [N12] Pyelonephritis     7/21/2023  1:30 AM Scott Lies E Modify [N12] Pyelonephritis Bilateral    7/21/2023  1:31 AM Scott Lies E Add [N20.1] Right ureteral stone     7/21/2023  1:31 AM Scott Lies E Add [N20.1] Left ureteral stone     7/21/2023  1:31 AM Arelis Stein E Add [A41.9] Sepsis ([address])     7/21/2023  1:59 AM Scott Lies E Remove [N20.1] Left ureteral stone       ED Disposition     ED Disposition   Transfer to Another Facility-In Network    Condition   --    Date/Time   Fri Jul 21, 2023  1:59 AM    Comment   Mayte Hare should be transferred out to [facility] under [facility]o, Dr. Nevaeh Bell. MD Documentation    Ana Beckman Most Recent Value   Patient Condition The patient has been stabilized such that within reasonable medical probability, no material deterioration of the patient condition or the condition of the unborn child(eddie) is likely to result from the transfer, An emergency transfer is being made prior to stabilization due to the need for definitive care and the benefit of transfer outweighs the risk   Reason for Transfer Level of Care needed not available at this facility   Benefits of Transfer Specialized equipment and/or services available at the receiving facility (Include comment)________________________  [Urology]   Risks of Transfer Potential for delay in receiving treatment, Potential deterioration of medical condition, Loss of IV   Accepting Physician Dr. Chitra Marcum NameMindy   Sending MD Dr. Denver Hudson   Provider Certification General risk, such as traffic hazards, adverse weather conditions, rough terrain or turbulence, possible failure of equipment (including vehicle or aircraft), or consequences of actions of persons outside the control of the transport personnel, Unanticipated needs of medical equipment and personnel during transport, Risk of worsening condition, The possibility of a transport vehicle being unavailable      RN Documentation    [address] NameMindy      Follow-up Information    None         Patient's Medications    No medications on file       No discharge procedures on file.     PDMP Review     None          ED Provider  Electronically Signed by           Patricia Devi,   07/21/23 6512

## 2023-07-21 NOTE — TELEPHONE ENCOUNTER
Patient had bilateral ureteral stents placed for definitive right mid ureteral stone and possible punctate left stone (although I did not see a left stone on imaging I did apprciate hydrouretronephrosis with urothelial enhancement consistent with pyelonephritis). Plan for bilateral ureteroscopy to treat stone burden as an outpatient after infection is cleared.

## 2023-07-21 NOTE — UTILIZATION REVIEW
Initial Clinical Review     OBSERVATION 7/21 0420 CHANGED TO INPATIENT 7/21 1023 DUE TO SEPSIS REQUIRING IV ANTIBIOTICS, IV FLUIDS     Admission: Date/Time/Statement:   Admission Orders (From admission, onward)     Ordered        07/21/23 1023  Inpatient Admission  Once            07/21/23 0420  Place in Observation  Once                      Orders Placed This Encounter   Procedures   • Place in Observation     Standing Status:   Standing     Number of Occurrences:   1     Order Specific Question:   Level of Care     Answer:   Med Surg [16]   • Inpatient Admission     Standing Status:   Standing     Number of Occurrences:   1     Order Specific Question:   Level of Care     Answer:   Med Surg [16]     Order Specific Question:   Estimated length of stay     Answer:   More than 2 Midnights     Order Specific Question:   Certification     Answer:   I certify that inpatient services are medically necessary for this patient for a duration of greater than two midnights. See H&P and MD Progress Notes for additional information about the patient's course of treatment. Initial Presentation: 36 y.o. female [facility] via EMS as transfer from [facility] ED for higher level of care. Pt presented to [address] [facility] ED 7/20 for worsening bilat flank pain, fevers and chills. CT AP revealed 3 mm right ureteral calculus, questionable punctate calculus left ureter, bilateral hydronephrosis, multifocal acute pyelonephritis. WBC elevated. UA appears grossly infected Transfer to [facility] for urgent bilateral renal decompression. Exam notes T max 100.7, + tachycardia, cap refill 2-3 secs, + rigors, + nausea. Admitted as  Inpatient for Sepsis, ureteral calculus , pyelonephritis. Plan IV Ceftriaxone, urine and bood cultures obtained, NPO, IVF, for OR with Urology     7/21 Urology consult history of recurrent lithiasis presented to emergency room with bilateral flank pain fevers chills frequency and urgency. White count was 17.1 creatinine was 0.8. meets sepsis criteria based on tachycardia leukocytosis with likely pyelonephritis. .  CT scan showed bilateral hydronephrosis with 3 mm right proximal ureteral stone and a possible punctate left proximal ureteral stone. Plan bilateral stent placement, bilateral ureteroscopy with laser lithotripsy at a later date after infection resolved.         OR 7/21    ANesthesia : General   Procedure:   Bilateral - CYSTOSCOPY RETROGRADE PYELOGRAM WITH INSERTION STENT URETERAL bilateral     Operative Findings:  Bladder had injected appearance of mucosa consistent with cystitis. Right retrograde pyelogram did not show obvious filling defect and showed mild hydroureteronephrosis. Stent placed without pus  Left side showed mild hydronephrosis without filling defect. Stent placed without pus.       ED Triage Vitals   Temperature Pulse Respirations Blood Pressure SpO2   07/21/23 0407 07/21/23 0457 07/21/23 0645 07/21/23 0407 07/21/23 0457   98 °F (36.7 °C) (!) 122 16 117/74 97 %      Temp Source Heart Rate Source Patient Position - Orthostatic VS BP Location FiO2 (%)   07/21/23 0645 07/21/23 0645 -- -- --   Temporal Monitor         Pain Score       07/21/23 0500       No Pain          Wt Readings from Last 1 Encounters:   07/21/23 74.8 kg (165 lb)     Additional Vital Signs:     Date/Time Temp Pulse Resp BP MAP (mmHg) SpO2 O2 Device   07/21/23 10:47:46 -- 78 -- 118/71 87 94 % --   07/21/23 10:18:45 97.9 °F (36.6 °C) 81 -- -- -- 97 % --   07/21/23 08:42:53 98.2 °F (36.8 °C) 100 16 112/76 88 93 % --   07/21/23 0822 97 °F (36.1 °C) Abnormal  100 20 110/60 -- -- --   07/21/23 0815 97 °F (36.1 °C) Abnormal  104 21 102/56 74 98 % --   07/21/23 0800 -- 114 Abnormal  24 Abnormal  104/57 74 95 % --   07/21/23 0758 97 °F (36.1 °C) Abnormal  -- 20 100/56 71 -- --   07/21/23 0645 100.7 °F (38.2 °C) Abnormal  107 Abnormal  16 112/55 -- 97 % None (Room air)   07/21/23 04:57:25 99.9 °F (37.7 °C) 122 Abnormal  -- -- -- 97 % --       Pertinent Labs/Diagnostic Test Results:   FL retrograde pyelogram    (Results Pending)         Results from last 7 days   Lab Units 07/21/23  0603 07/20/23  2227   WBC Thousand/uL 13.41* 17.11*   HEMOGLOBIN g/dL 12.4 13.9   HEMATOCRIT % 36.8 40.3   PLATELETS Thousands/uL 166 229   NEUTROS ABS Thousands/µL 12.29* 14.76*         Results from last 7 days   Lab Units 07/21/23  0603 07/20/23  2227   SODIUM mmol/L 138 133*   POTASSIUM mmol/L 3.5 3.8   CHLORIDE mmol/L 109* 102   CO2 mmol/L 22 24   ANION GAP mmol/L 7 7   BUN mg/dL 8 9   CREATININE mg/dL 0.66 0.80   EGFR ml/min/1.73sq m 108 90   CALCIUM mg/dL 8.0* 9.8   MAGNESIUM mg/dL  --  1.6*     Results from last 7 days   Lab Units 07/20/23  2227   AST U/L 42*   ALT U/L 35   ALK PHOS U/L 51   TOTAL PROTEIN g/dL 7.3   ALBUMIN g/dL 4.2   TOTAL BILIRUBIN mg/dL 1.10*   BILIRUBIN DIRECT mg/dL 0.23*         Results from last 7 days   Lab Units 07/21/23  0603 07/20/23  2227   GLUCOSE RANDOM mg/dL 118 126             Results from last 7 days   Lab Units 07/20/23  2227   PROCALCITONIN ng/ml 0.23     Results from last 7 days   Lab Units 07/20/23  2227   LACTIC ACID mmol/L 0.6           Results from last 7 days   Lab Units 07/20/23  2227   LIPASE u/L 7*                 Results from last 7 days   Lab Units 07/20/23  2305   CLARITY UA  Extra Turbid   COLOR UA  Yellow   SPEC GRAV UA  1.015   PH UA  6.0   GLUCOSE UA mg/dl Negative   KETONES UA mg/dl Negative   BLOOD UA  Large*   PROTEIN UA mg/dl 100 (2+)*   NITRITE UA  Positive*   BILIRUBIN UA  Negative   UROBILINOGEN UA (BE) mg/dl <2.0   LEUKOCYTES UA  Large*   WBC UA /hpf Innumerable*   RBC UA /hpf 30-50*   BACTERIA UA /hpf Innumerable*   EPITHELIAL CELLS WET PREP /hpf Occasional   MUCUS THREADS  Occasional*                                 Results from last 7 days   Lab Units 07/20/23 2230 07/20/23  2227   BLOOD CULTURE  Received in Microbiology Lab. Culture in Progress. Received in Microbiology Lab.  Culture in Progress. Past Medical History:   Diagnosis Date   • Hydradenitis          Admitting Diagnosis: Sepsis  Age/Sex: 36 y.o. female  Admission Orders:  Scheduled Medications:  cefazolin, 2,000 mg, Intravenous, Once  cefTRIAXone, 1,000 mg, Intravenous, Q24H  heparin (porcine), 5,000 Units, Subcutaneous, Q8H [address]      Continuous IV Infusions:    sodium chloride, 125 mL/hr, Intravenous, Continuous      PRN Meds:  acetaminophen, 650 mg, Oral, Q6H PRN  HYDROmorphone, 0.5 mg, Intravenous, Q4H PRN  ketorolac, 15 mg, Intravenous, Q6H PRN  ondansetron, 4 mg, Intravenous, Q6H PRN  oxyCODONE, 5 mg, Oral, Q6H PRN       SCD's    Post op regular diet     IP CONSULT TO UROLOGY    Network Utilization Review Department  ATTENTION: Please call with any questions or concerns to [phone] and carefully listen to the prompts so that you are directed to the right person. All voicemails are confidential.  Alejandro Ruiz all requests for admission clinical reviews, approved or denied determinations and any other requests to dedicated fax number below belonging to the campus where the patient is receiving treatment.  List of dedicated fax numbers for the Facilities:  [place] DENIALS (Administrative/Medical Necessity) [phone]   [facility] ([facility]/Pediatrics) [phone]   [address] [place] [address] Drive [phone]   [facility] [phone]   [facility] [phone]   [address] [address] [phone]

## 2023-07-21 NOTE — ASSESSMENT & PLAN NOTE
· Bilateral ureteral stones, 3mm proximal right ureter and questionable punctate calculus in left proximal ureter with bilateral hydroureter and superimposed UTI and multifocal acute pyelonephritis   · NPO  · IVF   · PRN analgesia/antiemetics   · Urology planning for bilateral renal decompression via cystoscopy bilateral ureteral stent insertion this morning

## 2023-07-21 NOTE — ASSESSMENT & PLAN NOTE
· UA appears grossly infected  · CT A/P shows 3 mm ureteral calculus within the proximal right ureter with questionable punctate calculus in the left proximal ureter. Associated mild bilateral hydroureter without significant hydronephrosis. Multifocal acute pyelonephritis.    · IV Ceftriaxone   · Follow up urine cx   · Urology following

## 2023-07-21 NOTE — TREATMENT PLAN
36year old female passed stone few days ago now with 24 hrs of progressive flank pain fevers and chills. WBC 17. Creatinine 0.8. LA 0.6. Tachycardia 123 bpm and tmax 100.4 responded quickly to fluid hydration and antipyretics. She is voiding/making urine. She is reasonably comfortable. CT with bilateral mild hydronehprosis definite right proximal stone 3mm and probably punctate left proximal ureteral stone. There is developing multifocal patchiness consistent with pyelonephritis without renal abscess. Recommendation is for bilateral renal decompression via cystoscopy bilateral ureteral stent insertion. This can safely be achieved first case of day AM at [facility] by urologist Dr Derick Baxter. [facility] ER making arrangements to Dax Moran for continued care and expedited urology consultation for operative plan above. IV ceftriaxone and hydration ongoing.  She willremain NPO    /58 (BP Location: Right arm)   Pulse 82   Temp 98.8 °F (37.1 °C) (Oral)   Resp 18   Ht 5' 1" (1.549 m)   Wt 74.8 kg (165 lb)   SpO2 96%   BMI 31.18 kg/m²     Marcia Damian  07/21/23  1:28 AM

## 2023-07-21 NOTE — ASSESSMENT & PLAN NOTE
· POA a/e/b tachycardia and leukocytosis  · Tmax 100.4F  · Source: multifocal acute pyelonephritis with bilateral ureteral calculi and bilateral hydroureter   · IV Ceftriaxone   · Plan for urological intervention in the AM for bilateral renal decompression via cystoscopy bilateral ureteral stent insertion  · Follow up urine and blood cultures   · Monitor fever curve/hemodynamics

## 2023-07-21 NOTE — EMTALA/ACUTE CARE TRANSFER
[address] [facility] [address]  Dept: [phone]      EMTALA TRANSFER CONSENT    NAME Brinda Wayne                                         1980                              MRN [id number]    I have been informed of my rights regarding examination, treatment, and transfer   by Dr. Oscar Rose,*    Benefits: Specialized equipment and/or services available at the receiving facility (Include comment)________________________ (Urology)    Risks: Potential for delay in receiving treatment, Potential deterioration of medical condition, Loss of IV      Consent for Transfer:  I acknowledge that my medical condition has been evaluated and explained to me by the emergency department physician or other qualified medical person and/or my attending physician, who has recommended that I be transferred to the service of  Accepting Physician: Dr. Sandoval Saunders at [address] [place] [address] [address] Name, [address] [address] : [address] [place] [address] Rd. The above potential benefits of such transfer, the potential risks associated with such transfer, and the probable risks of not being transferred have been explained to me, and I fully understand them. The doctor has explained that, in my case, the benefits of transfer outweigh the risks. I agree to be transferred. I authorize the performance of emergency medical procedures and treatments upon me in both transit and upon arrival at the receiving facility. Additionally, I authorize the release of any and all medical records to the receiving facility and request they be transported with me, if possible. I understand that the safest mode of transportation during a medical emergency is an ambulance and that the Hospital advocates the use of this mode of transport.  Risks of traveling to the receiving facility by car, including absence of medical control, life sustaining equipment, such as oxygen, and medical personnel has been explained to me and I fully understand them. (ELIZABETH CORRECT BOX BELOW)  [  ]  I consent to the stated transfer and to be transported by ambulance/helicopter. [  ]  I consent to the stated transfer, but refuse transportation by ambulance and accept full responsibility for my transportation by car. I understand the risks of non-ambulance transfers and I exonerate the Hospital and its staff from any deterioration in my condition that results from this refusal.    X___________________________________________    DATE  23  TIME________  Signature of patient or legally responsible individual signing on patient behalf           RELATIONSHIP TO PATIENT_________________________          Provider Certification    NAME Meliton Frias                                        [facility] 1980                              MRN [id number]    A medical screening exam was performed on the above named patient. Based on the examination:    Condition Necessitating Transfer The primary encounter diagnosis was Bilateral hydronephrosis. Diagnoses of Pyelonephritis, Right ureteral stone, and Sepsis ([address]) were also pertinent to this visit.     Patient Condition: The patient has been stabilized such that within reasonable medical probability, no material deterioration of the patient condition or the condition of the unborn child(eddie) is likely to result from the transfer, An emergency transfer is being made prior to stabilization due to the need for definitive care and the benefit of transfer outweighs the risk    Reason for Transfer: Level of Care needed not available at this facility    Transfer Requirements: [facility]   · Space available and qualified personnel available for treatment as acknowledged by    · Agreed to accept transfer and to provide appropriate medical treatment as acknowledged by       Dr. Masoud Davis  · Appropriate medical records of the examination and treatment of the patient are provided at the time of transfer   [address] _______  · Transfer will be performed by qualified personnel from    and appropriate transfer equipment as required, including the use of necessary and appropriate life support measures. Provider Certification: I have examined the patient and explained the following risks and benefits of being transferred/refusing transfer to the patient/family:  General risk, such as traffic hazards, adverse weather conditions, rough terrain or turbulence, possible failure of equipment (including vehicle or aircraft), or consequences of actions of persons outside the control of the transport personnel, Unanticipated needs of medical equipment and personnel during transport, Risk of worsening condition, The possibility of a transport vehicle being unavailable      Based on these reasonable risks and benefits to the patient and/or the unborn child(eddie), and based upon the information available at the time of the patient’s examination, I certify that the medical benefits reasonably to be expected from the provision of appropriate medical treatments at another medical facility outweigh the increasing risks, if any, to the individual’s medical condition, and in the case of labor to the unborn child, from effecting the transfer.     X____________________________________________ DATE 07/21/23        TIME_______      ORIGINAL - SEND TO MEDICAL RECORDS   COPY - SEND WITH PATIENT DURING TRANSFER

## 2023-07-21 NOTE — ANESTHESIA PREPROCEDURE EVALUATION
Procedure:  CYSTOSCOPY RETROGRADE PYELOGRAM WITH INSERTION STENT URETERAL (Bilateral: Bladder)    Relevant Problems   No relevant active problems        Physical Exam    Airway    Mallampati score: II  TM Distance: >3 FB  Neck ROM: full     Dental       Cardiovascular      Pulmonary      Other Findings        Anesthesia Plan  ASA Score- 2     Anesthesia Type- general with ASA Monitors. Additional Monitors:   Airway Plan: LMA. Plan Factors-Exercise tolerance (METS): >4 METS. Chart reviewed. Patient is not a current smoker. Patient did not smoke on day of surgery. Obstructive sleep apnea risk education given perioperatively. Induction- intravenous. Postoperative Plan- Plan for postoperative opioid use. Planned trial extubation    Informed Consent- Anesthetic plan and risks discussed with patient. I personally reviewed this patient with the CRNA. Discussed and agreed on the Anesthesia Plan with the CRNA. Ruthie Ang Anesthesia plan and consent discussed with  who expressed understanding and agreement. Risks/benefits and alternatives discussed with patient including possible PONV, sore throat, damage to teeth/lips/gums and possibility of rare anesthetic and surgical emergencies.

## 2023-07-22 PROCEDURE — 99232 SBSQ HOSP IP/OBS MODERATE 35: CPT | Performed by: FAMILY MEDICINE

## 2023-07-22 PROCEDURE — 99232 SBSQ HOSP IP/OBS MODERATE 35: CPT | Performed by: UROLOGY

## 2023-07-22 RX ORDER — SODIUM CHLORIDE 9 MG/ML
100 INJECTION, SOLUTION INTRAVENOUS CONTINUOUS
Status: DISPENSED | OUTPATIENT
Start: 2023-07-22 | End: 2023-07-22

## 2023-07-22 RX ORDER — ENOXAPARIN SODIUM 100 MG/ML
40 INJECTION SUBCUTANEOUS
Status: DISCONTINUED | OUTPATIENT
Start: 2023-07-23 | End: 2023-07-23 | Stop reason: HOSPADM

## 2023-07-22 RX ORDER — OXYCODONE HYDROCHLORIDE 5 MG/1
5 TABLET ORAL EVERY 4 HOURS PRN
Status: DISCONTINUED | OUTPATIENT
Start: 2023-07-22 | End: 2023-07-23 | Stop reason: HOSPADM

## 2023-07-22 RX ORDER — KETOROLAC TROMETHAMINE 30 MG/ML
15 INJECTION, SOLUTION INTRAMUSCULAR; INTRAVENOUS EVERY 6 HOURS SCHEDULED
Status: DISCONTINUED | OUTPATIENT
Start: 2023-07-22 | End: 2023-07-22

## 2023-07-22 RX ORDER — OXYBUTYNIN CHLORIDE 5 MG/1
10 TABLET, EXTENDED RELEASE ORAL DAILY
Status: DISCONTINUED | OUTPATIENT
Start: 2023-07-22 | End: 2023-07-23 | Stop reason: HOSPADM

## 2023-07-22 RX ORDER — OXYCODONE HYDROCHLORIDE 10 MG/1
10 TABLET ORAL EVERY 4 HOURS PRN
Status: DISCONTINUED | OUTPATIENT
Start: 2023-07-22 | End: 2023-07-23 | Stop reason: HOSPADM

## 2023-07-22 RX ORDER — FAMOTIDINE 20 MG/1
20 TABLET, FILM COATED ORAL 2 TIMES DAILY PRN
Status: DISCONTINUED | OUTPATIENT
Start: 2023-07-22 | End: 2023-07-23 | Stop reason: HOSPADM

## 2023-07-22 RX ADMIN — CEFTRIAXONE 1000 MG: 2 INJECTION, POWDER, FOR SOLUTION INTRAMUSCULAR; INTRAVENOUS at 23:17

## 2023-07-22 RX ADMIN — SODIUM CHLORIDE 75 ML/HR: 0.9 INJECTION, SOLUTION INTRAVENOUS at 04:59

## 2023-07-22 RX ADMIN — OXYCODONE HYDROCHLORIDE 5 MG: 5 TABLET ORAL at 17:59

## 2023-07-22 RX ADMIN — SODIUM CHLORIDE 100 ML/HR: 0.9 INJECTION, SOLUTION INTRAVENOUS at 09:58

## 2023-07-22 RX ADMIN — SODIUM CHLORIDE 100 ML/HR: 0.9 INJECTION, SOLUTION INTRAVENOUS at 18:35

## 2023-07-22 RX ADMIN — OXYBUTYNIN CHLORIDE 10 MG: 5 TABLET, EXTENDED RELEASE ORAL at 09:57

## 2023-07-22 RX ADMIN — HYDROMORPHONE HYDROCHLORIDE 0.5 MG: 1 INJECTION, SOLUTION INTRAMUSCULAR; INTRAVENOUS; SUBCUTANEOUS at 06:53

## 2023-07-22 RX ADMIN — HEPARIN SODIUM 5000 UNITS: 5000 INJECTION INTRAVENOUS; SUBCUTANEOUS at 05:00

## 2023-07-22 RX ADMIN — KETOROLAC TROMETHAMINE 15 MG: 30 INJECTION, SOLUTION INTRAMUSCULAR at 10:20

## 2023-07-22 RX ADMIN — OXYCODONE HYDROCHLORIDE 10 MG: 10 TABLET ORAL at 23:16

## 2023-07-22 RX ADMIN — FAMOTIDINE 20 MG: 20 TABLET, FILM COATED ORAL at 20:42

## 2023-07-22 NOTE — PROGRESS NOTES
Progress Note - Mario Kaiser 36 y.o. female MRN: [id number]    Unit/Bed#: W -01 Encounter: [id number]      Assessment:  Mario Kaiser is a 79-year-old female with history of nephrolithiasis and bilateral ureteral obstructing calculi. Postoperative day 1 cystoscopy, bilateral retrograde pyelography and insertion of bilateral ureteral stents. Patient is afebrile and hemodynamically stable. She offers complaints of mild dysuria without flank, abdominal pain, nausea/vomiting. Renal function within normal limits. Downward trend of leukocytosis. Preliminary urine culture positive for GNR growth. Plan:  Continue medical optimization, symptom management and empiric antibiotics. Narrow per sensitivities. Discharge at the discretion of the internal medicine service and deemed appropriate with full antimicrobial course. Patient is from out [facility]. She is established with a urologist there. She will seek follow-up to arrange for ureteroscopy. Unfortunately, patient is not naïve  2 stone procedures. No further  intervention indicated during this hospital stay. Urology will sign off. Subjective:   Denies fever, chills. Endorses mild dysuria and hematuria--to be expected. Objective:     Vitals: Blood pressure 117/72, pulse 75, temperature 97.9 °F (36.6 °C), resp. rate 15, height 5' 1" (1.549 m), weight 74.8 kg (165 lb), SpO2 98 %. ,Body mass index is 31.18 kg/m².       Intake/Output Summary (Last 24 hours) at 7/22/2023 1053  Last data filed at 7/22/2023 0825  Gross per 24 hour   Intake 480 ml   Output --   Net 480 ml       Physical Exam: General appearance: alert and oriented, in no acute distress, appears stated age, cooperative and no distress  Head: Normocephalic, without obvious abnormality, atraumatic  Neck: no JVD and supple, symmetrical, trachea midline  Lungs: clear to auscultation bilaterally  Heart: regular rate and rhythm, S1, S2 normal, no murmur, click, rub or gallop  Abdomen: soft, non-tender; bowel sounds normal; no masses,  no organomegaly  Extremities: extremities normal, warm and well-perfused; no cyanosis, clubbing, or edema  Pulses: 2+ and symmetric  Neurologic: Grossly normal  Bilateral internalized ureteral stents. No external urinary drains     Invasive Devices     Peripheral Intravenous Line  Duration           Peripheral IV 07/20/23 Left Hand 1 day    Peripheral IV 07/20/23 Right Antecubital 1 day          Drain  Duration           Ureteral Internal Stent Left ureter 6 Fr. 1 day    Ureteral Internal Stent Right ureter 6 Fr. 1 day              Lab Results   Component Value Date    WBC 13.41 (H) 07/21/2023    HGB 12.4 07/21/2023    HCT 36.8 07/21/2023    MCV 91 07/21/2023     07/21/2023      Lab Results   Component Value Date    SODIUM 138 07/21/2023    K 3.5 07/21/2023     (H) 07/21/2023    CO2 22 07/21/2023    BUN 8 07/21/2023    CREATININE 0.66 07/21/2023    GLUC 118 07/21/2023    CALCIUM 8.0 (L) 07/21/2023         Lab, Imaging and other studies: I have personally reviewed pertinent reports.

## 2023-07-22 NOTE — UTILIZATION REVIEW
Continued Stay Review    Date: 7/22/2023                          Current Patient Class: inpatient    Current Level of Care: med surg     HPI:42 y.o. female initially admit OBSERVATION 7/21 0420 CHANGED TO INPATIENT 7/21 1023 DUE TO SEPSIS REQUIRING IV ANTIBIOTICS, IV FLUIDS     Assessment/Plan:   Urology Provider    afebrile and hemodynamically stable. Complaints of mild dysuria without flank, abdominal pain, nausea/vomiting. Renal function within normal limits. Downward trend of leukocytosis. Preliminary urine culture positive for GNR growth. Continue medical optimization, on IVF,  symptom management and empiric antibiotics. Narrow per sensitivities. Discharge at the discretion of the internal medicine service and deemed appropriate with full antimicrobial course. From out of [facility]/[place]. She is established with a urologist there. She will seek follow-up to arrange for ureteroscopy. Unfortunately, patient is not naïve  2 stone procedures. No further  intervention indicated during this hospital stay. Attending MD  Reports passing pieces of kidney stone and urinating frequently. S/p Urology procedure on IV antibx, IVF, monitor fever/ HD. Follow Urine & blood CXs. PRN analgesia/ antiemetics.  Encourage ambulation  Vital Signs:   Date/Time Temp Pulse Resp BP MAP (mmHg) SpO2 O2 Device   07/22/23 0900 -- -- -- -- -- -- None (Room air)   07/22/23 07:54:06 97.9 °F (36.6 °C) 75 15 117/72 87 98 % --   07/21/23 21:10:49 98 °F (36.7 °C) 87 -- 139/73 95 97 % --   07/21/23 18:44:32 98 °F (36.7 °C) 72 -- 115/69 84 98 % --   07/21/23 1700 98.9 °F (37.2 °C) 90 16 116/70 -- 98 %        Pertinent Labs/Diagnostic Results:       Results from last 7 days   Lab Units 07/21/23  0603 07/20/23  2227   WBC Thousand/uL 13.41* 17.11*   HEMOGLOBIN g/dL 12.4 13.9   HEMATOCRIT % 36.8 40.3   PLATELETS Thousands/uL 166 229   NEUTROS ABS Thousands/µL 12.29* 14.76*         Results from last 7 days   Lab Units 07/21/23  1198 07/20/23 2227   SODIUM mmol/L 138 133*   POTASSIUM mmol/L 3.5 3.8   CHLORIDE mmol/L 109* 102   CO2 mmol/L 22 24   ANION GAP mmol/L 7 7   BUN mg/dL 8 9   CREATININE mg/dL 0.66 0.80   EGFR ml/min/1.73sq m 108 90   CALCIUM mg/dL 8.0* 9.8   MAGNESIUM mg/dL  --  1.6*     Results from last 7 days   Lab Units 07/20/23 2227   AST U/L 42*   ALT U/L 35   ALK PHOS U/L 51   TOTAL PROTEIN g/dL 7.3   ALBUMIN g/dL 4.2   TOTAL BILIRUBIN mg/dL 1.10*   BILIRUBIN DIRECT mg/dL 0.23*         Results from last 7 days   Lab Units 07/21/23  0603 07/20/23 2227   GLUCOSE RANDOM mg/dL 118 126             No results found for: "BETA-HYDROXYBUTYRATE"                                   Results from last 7 days   Lab Units 07/20/23 2227   PROCALCITONIN ng/ml 0.23     Results from last 7 days   Lab Units 07/20/23 2227   LACTIC ACID mmol/L 0.6                                 Results from last 7 days   Lab Units 07/20/23  2227   LIPASE u/L 7*                 Results from last 7 days   Lab Units 07/20/23  2305   CLARITY UA  Extra Turbid   COLOR UA  Yellow   SPEC GRAV UA  1.015   PH UA  6.0   GLUCOSE UA mg/dl Negative   KETONES UA mg/dl Negative   BLOOD UA  Large*   PROTEIN UA mg/dl 100 (2+)*   NITRITE UA  Positive*   BILIRUBIN UA  Negative   UROBILINOGEN UA (BE) mg/dl <2.0   LEUKOCYTES UA  Large*   WBC UA /hpf Innumerable*   RBC UA /hpf 30-50*   BACTERIA UA /hpf Innumerable*   EPITHELIAL CELLS WET PREP /hpf Occasional   MUCUS THREADS  Occasional*                                 Results from last 7 days   Lab Units 07/20/23  2305 07/20/23  2230 07/20/23 2227   BLOOD CULTURE   --  No Growth at 24 hrs. No Growth at 24 hrs.    URINE CULTURE  >100,000 cfu/ml Gram Negative Zhao Enteric Like*  --   --                    Medications:   Scheduled Medications:  cefTRIAXone, 1,000 mg, Intravenous, Q24H  [START ON 7/23/2023] enoxaparin, 40 mg, Subcutaneous, Q24H RIGO  oxybutynin, 10 mg, Oral, Daily      Continuous IV Infusions:  sodium chloride, 100 mL/hr, Intravenous, Continuous      PRN Meds:  acetaminophen, 650 mg, Oral, Q6H PRN  HYDROmorphone, 0.5 mg, Intravenous, Q4H PRN  ondansetron, 4 mg, Intravenous, Q6H PRN  oxyCODONE, 10 mg, Oral, Q4H PRN  oxyCODONE, 5 mg, Oral, Q4H DC    Discharge Plan: D    Network Utilization Review Department  ATTENTION: Please call with any questions or concerns to [phone] and carefully listen to the prompts so that you are directed to the right person. All voicemails are confidential.  Mari Bolaños all requests for admission clinical reviews, approved or denied determinations and any other requests to dedicated fax number below belonging to the campus where the patient is receiving treatment.  List of dedicated fax numbers for the Facilities:  [place] DENIALS (Administrative/Medical Necessity) [phone] [address] [facility] (Maternity/NICU/Pediatrics) [phone]   [address] [place] [address] Drive [phone]   [facility] [address] [facility] [phone]   [address] [place] [address] [phone]   [address] [facility] [address] [facility] [address] [place] [address] [phone]

## 2023-07-22 NOTE — ASSESSMENT & PLAN NOTE
· Bilateral ureteral stones, 3mm proximal right ureter and questionable punctate calculus in left proximal ureter with bilateral hydroureter and superimposed UTI and multifocal acute pyelo  · PRN analgesia/antiemetics   · S/p bilateral ureteral stents. Patient states she is passing pieces of stones and is feeling much better. May discontinue masimo on patient request. encourage ambulation.

## 2023-07-22 NOTE — PROGRESS NOTES
[address] [facility]  Progress Note  Name: Lisa Lowe  MRN: [id number]  Unit/Bed#: W -01 I Date of Admission: 7/21/2023   Date of Service: 7/22/2023 I Hospital Day: 1    Assessment/Plan   * Sepsis ([address])  Assessment & Plan  · POA a/e/b tachycardia and leukocytosis  · Tmax 100.4F  · Source: multifocal acute pyelonephritis with bilateral ureteral calculi and bilateral hydroureter   · IV Ceftriaxone   · Bilateral - CYSTOSCOPY RETROGRADE PYELOGRAM WITH INSERTION STENT URETERAL bilateral pod 1  · Follow up urine and blood cultures   · Monitor fever curve/hemodynamics    Pyelonephritis  Assessment & Plan  · UA appears grossly infected  · CT A/P shows 3 mm ureteral calculus within the proximal right ureter with questionable punctate calculus in the left proximal ureter. Associated mild bilateral hydroureter without significant hydronephrosis. Multifocal acute pyelonephritis. · IV Ceftriaxone   · Follow up urine cx   · Urology following     Ureteral calculus  Assessment & Plan  · Bilateral ureteral stones, 3mm proximal right ureter and questionable punctate calculus in left proximal ureter with bilateral hydroureter and superimposed UTI and multifocal acute pyelo  · PRN analgesia/antiemetics   · S/p bilateral ureteral stents. Patient states she is passing pieces of stones and is feeling much better. May discontinue masimo on patient request. encourage ambulation. VTE Pharmacologic Prophylaxis:   Pharmacologic: Enoxaparin (Lovenox)  Mechanical VTE Prophylaxis in Place: Yes    Patient Centered Rounds: I have performed bedside rounds with nursing staff today. Discussions with Specialists or Other Care Team Provider: none    Education and Discussions with Family / Patient: dw patient    Time Spent for Care: 30 minutes. More than 50% of total time spent on counseling and coordination of care as described above.     Current Length of Stay: 1 day(s)    Current Patient Status: Inpatient   Certification Statement: The patient will continue to require additional inpatient hospital stay due to Acute pyelo-    Discharge Plan: Anticipate discharge tomorrow    Code Status: Level 1 - Full Code      Subjective:   Patient denies any chest pain or shortness of breath her back pain is also improving and she is passing pieces of kidney stone and urinating frequently. No fever reported for the last 24 hours    Objective:     Vitals:   Temp (24hrs), Av.2 °F (36.8 °C), Min:97.9 °F (36.6 °C), Max:98.9 °F (37.2 °C)    Temp:  [97.9 °F (36.6 °C)-98.9 °F (37.2 °C)] 97.9 °F (36.6 °C)  HR:  [72-90] 75  Resp:  [15-16] 15  BP: (115-139)/(69-73) 117/72  SpO2:  [97 %-98 %] 98 %  Body mass index is 31.18 kg/m². Input and Output Summary (last 24 hours): Intake/Output Summary (Last 24 hours) at 2023 1133  Last data filed at 2023 0825  Gross per 24 hour   Intake 480 ml   Output --   Net 480 ml       Physical Exam:     Physical Exam  Vitals and nursing note reviewed. Constitutional:       Appearance: Normal appearance. HENT:      Head: Normocephalic and atraumatic. Right Ear: External ear normal.      Left Ear: External ear normal.      Nose: Nose normal.      Mouth/Throat:      Pharynx: Oropharynx is clear. Cardiovascular:      Rate and Rhythm: Normal rate and regular rhythm. Heart sounds: Normal heart sounds. Pulmonary:      Effort: Pulmonary effort is normal.      Breath sounds: Normal breath sounds. Abdominal:      General: Bowel sounds are normal.      Palpations: Abdomen is soft. Tenderness: There is no abdominal tenderness. Musculoskeletal:         General: Normal range of motion. Cervical back: Normal range of motion and neck supple. Skin:     General: Skin is warm and dry. Capillary Refill: Capillary refill takes less than 2 seconds. Neurological:      General: No focal deficit present.       Mental Status: She is alert and oriented to person, place, and time. Psychiatric:         Mood and Affect: Mood normal.           Additional Data:     Labs:    Results from last 7 days   Lab Units 07/21/23  0603   WBC Thousand/uL 13.41*   HEMOGLOBIN g/dL 12.4   HEMATOCRIT % 36.8   PLATELETS Thousands/uL 166   NEUTROS PCT % 92*   LYMPHS PCT % 3*   MONOS PCT % 4   EOS PCT % 0     Results from last 7 days   Lab Units 07/21/23  0603 07/20/23  2227   SODIUM mmol/L 138 133*   POTASSIUM mmol/L 3.5 3.8   CHLORIDE mmol/L 109* 102   CO2 mmol/L 22 24   BUN mg/dL 8 9   CREATININE mg/dL 0.66 0.80   ANION GAP mmol/L 7 7   CALCIUM mg/dL 8.0* 9.8   ALBUMIN g/dL  --  4.2   TOTAL BILIRUBIN mg/dL  --  1.10*   ALK PHOS U/L  --  51   ALT U/L  --  35   AST U/L  --  42*   GLUCOSE RANDOM mg/dL 118 126                 Results from last 7 days   Lab Units 07/20/23  2227   LACTIC ACID mmol/L 0.6   PROCALCITONIN ng/ml 0.23           * I Have Reviewed All Lab Data Listed Above. * Additional Pertinent Lab Tests Reviewed: [address] Admission Reviewed    Imaging:    Imaging Reports Reviewed Today Include: ct abd  Imaging Personally Reviewed by Myself Includes:  Ct abd    Recent Cultures (last 7 days):     Results from last 7 days   Lab Units 07/20/23  2305 07/20/23  2230 07/20/23  2227   BLOOD CULTURE   --  No Growth at 24 hrs. No Growth at 24 hrs.    URINE CULTURE  >100,000 cfu/ml Gram Negative Zhao Enteric Like*  --   --        Last 24 Hours Medication List:   Current Facility-Administered Medications   Medication Dose Route Frequency Provider Last Rate   • acetaminophen  650 mg Oral Q6H PRN Funmi Quezada MD     • cefTRIAXone  1,000 mg Intravenous Q24H Funmi Quezada MD 1,000 mg (07/21/23 2303)   • [START ON 7/23/2023] enoxaparin  40 mg Subcutaneous Q24H [facility] Benita Herring MD     • HYDROmorphone  0.5 mg Intravenous Q4H PRN Funmi Quezada MD     • ondansetron  4 mg Intravenous Q6H PRN Funmi Quezada MD     • oxybutynin  10 mg Oral Daily Billie Rajni, CRNP     • oxyCODONE  10 mg Oral Q4H PRN AUDREY Pak     • oxyCODONE  5 mg Oral Q4H PRN AUDREY Pak     • sodium chloride  100 mL/hr Intravenous Continuous AUDREY Pak 100 mL/hr (07/22/23 6120)        Today, Patient Was Seen By: Stephanie Lomeli MD    ** Please Note: Dictation voice to text software may have been used in the creation of this document.  **

## 2023-07-22 NOTE — ASSESSMENT & PLAN NOTE
· POA a/e/b tachycardia and leukocytosis  · Tmax 100.4F  · Source: multifocal acute pyelonephritis with bilateral ureteral calculi and bilateral hydroureter   · IV Ceftriaxone   · Bilateral - CYSTOSCOPY RETROGRADE PYELOGRAM WITH INSERTION STENT URETERAL bilateral pod 1  · Follow up urine and blood cultures   · Monitor fever curve/hemodynamics

## 2023-07-23 VITALS
BODY MASS INDEX: 31.15 KG/M2 | RESPIRATION RATE: 16 BRPM | HEART RATE: 83 BPM | SYSTOLIC BLOOD PRESSURE: 130 MMHG | OXYGEN SATURATION: 93 % | DIASTOLIC BLOOD PRESSURE: 80 MMHG | HEIGHT: 61 IN | TEMPERATURE: 98.6 F | WEIGHT: 165 LBS

## 2023-07-23 PROBLEM — A41.9 SEPSIS (HCC): Status: RESOLVED | Noted: 2023-07-21 | Resolved: 2023-07-23

## 2023-07-23 PROBLEM — N12 PYELONEPHRITIS: Status: RESOLVED | Noted: 2023-07-21 | Resolved: 2023-07-23

## 2023-07-23 LAB
ANION GAP SERPL CALCULATED.3IONS-SCNC: 6 MMOL/L
BACTERIA UR CULT: ABNORMAL
BACTERIA UR CULT: ABNORMAL
BUN SERPL-MCNC: 9 MG/DL (ref 5–25)
CALCIUM SERPL-MCNC: 8.2 MG/DL (ref 8.4–10.2)
CHLORIDE SERPL-SCNC: 110 MMOL/L (ref 96–108)
CO2 SERPL-SCNC: 24 MMOL/L (ref 21–32)
CREAT SERPL-MCNC: 0.69 MG/DL (ref 0.6–1.3)
ERYTHROCYTE [DISTWIDTH] IN BLOOD BY AUTOMATED COUNT: 12.4 % (ref 11.6–15.1)
GFR SERPL CREATININE-BSD FRML MDRD: 106 ML/MIN/1.73SQ M
GLUCOSE SERPL-MCNC: 88 MG/DL (ref 65–140)
HCT VFR BLD AUTO: 35.8 % (ref 34.8–46.1)
HGB BLD-MCNC: 11.9 G/DL (ref 11.5–15.4)
MCH RBC QN AUTO: 30.7 PG (ref 26.8–34.3)
MCHC RBC AUTO-ENTMCNC: 33.2 G/DL (ref 31.4–37.4)
MCV RBC AUTO: 93 FL (ref 82–98)
PLATELET # BLD AUTO: 200 THOUSANDS/UL (ref 149–390)
PMV BLD AUTO: 8.5 FL (ref 8.9–12.7)
POTASSIUM SERPL-SCNC: 4 MMOL/L (ref 3.5–5.3)
RBC # BLD AUTO: 3.87 MILLION/UL (ref 3.81–5.12)
SODIUM SERPL-SCNC: 140 MMOL/L (ref 135–147)
WBC # BLD AUTO: 9.18 THOUSAND/UL (ref 4.31–10.16)

## 2023-07-23 PROCEDURE — 80048 BASIC METABOLIC PNL TOTAL CA: CPT | Performed by: FAMILY MEDICINE

## 2023-07-23 PROCEDURE — 99239 HOSP IP/OBS DSCHRG MGMT >30: CPT | Performed by: HOSPITALIST

## 2023-07-23 PROCEDURE — 85027 COMPLETE CBC AUTOMATED: CPT | Performed by: FAMILY MEDICINE

## 2023-07-23 RX ORDER — OXYBUTYNIN CHLORIDE 10 MG/1
10 TABLET, EXTENDED RELEASE ORAL DAILY
Qty: 28 TABLET | Refills: 0 | Status: SHIPPED | OUTPATIENT
Start: 2023-07-24 | End: 2023-08-21

## 2023-07-23 RX ORDER — OXYCODONE HYDROCHLORIDE 5 MG/1
5 TABLET ORAL EVERY 6 HOURS PRN
Qty: 20 TABLET | Refills: 0 | Status: SHIPPED | OUTPATIENT
Start: 2023-07-23 | End: 2023-07-28

## 2023-07-23 RX ORDER — CEPHALEXIN 500 MG/1
500 CAPSULE ORAL EVERY 8 HOURS SCHEDULED
Qty: 15 CAPSULE | Refills: 0 | Status: SHIPPED | OUTPATIENT
Start: 2023-07-23 | End: 2023-07-28

## 2023-07-23 RX ADMIN — OXYBUTYNIN CHLORIDE 10 MG: 5 TABLET, EXTENDED RELEASE ORAL at 08:06

## 2023-07-23 RX ADMIN — ENOXAPARIN SODIUM 40 MG: 40 INJECTION SUBCUTANEOUS at 08:06

## 2023-07-23 NOTE — ASSESSMENT & PLAN NOTE
· Bilateral ureteral stones, 3mm proximal right ureter and questionable punctate calculus in left proximal ureter with bilateral hydroureter and superimposed UTI and multifocal acute pyelo    · S/p bilateral ureteral stents. Patient states she is passing pieces of stones and is feeling much better. No fever, leukocytosis has resolved. Had some bladder spasms last night but otherwise doing okay.   Eager to be discharged home

## 2023-07-23 NOTE — PROGRESS NOTES
[address] [facility]  Progress Note  Name: Kathi Fraser  MRN: [id number]  Unit/Bed#: W -87 I Date of Admission: 7/21/2023   Date of Service: 7/23/2023 I Hospital Day: 2    Assessment/Plan   * Sepsis ([address])  Assessment & Plan  · POA a/e/b tachycardia and leukocytosis  · Tmax 100.4F  · Source: multifocal acute pyelonephritis with bilateral ureteral calculi and bilateral hydroureter   · Bilateral - CYSTOSCOPY RETROGRADE PYELOGRAM WITH INSERTION STENT URETERAL bilateral pod 2  · Doing well. Afebrile. WBC normalized. Urine culture growing E. coli susceptible to cefazolin. Will transition to Keflex 500 mg 3 times daily for 5 days and have her follow-up with her urologist in [place] · Stable for discharge    Pyelonephritis  Assessment & Plan  · 44-year-old female with a history of recurrent nephrolithiasis presented to the ER with bilateral flank pain, fever chills frequency and urgency. · CT A/P showed 3 mm ureteral calculus within the proximal right ureter with questionable punctate calculus in the left proximal ureter. Associated mild bilateral hydroureter without significant hydronephrosis. Multifocal acute pyelonephritis. · Treated for sepsis  · She was evaluated by urology and underwent cystoscopy with bilateral retrograde pyelography and insertion of bilateral ureteral stents. · Remained stable since then. POD day 2. No fever, WBC has normalized. Renal functions normal  · Urine culture growing E. coli sensitive to cefazolin. · Switch to Keflex upon discharge for a 5-day course. · We will follow-up with her own urologist in [place] Ureteral calculus  Assessment & Plan  · Bilateral ureteral stones, 3mm proximal right ureter and questionable punctate calculus in left proximal ureter with bilateral hydroureter and superimposed UTI and multifocal acute pyelo    · S/p bilateral ureteral stents.   Patient states she is passing pieces of stones and is feeling much better. No fever, leukocytosis has resolved. Had some bladder spasms last night but otherwise doing okay. Eager to be discharged home           Subjective: Feels well. No fever, leukocytosis has resolved. Renal functions normal.  Patient is day 2 ureteral stent placement bilaterally. Eager to be discharged. Will go back to [place] to follow-up with her urologist.    Physical Exam:   Vitals: Blood pressure 130/80, pulse 83, temperature 98.6 °F (37 °C), resp. rate 16, height 5' 1" (1.549 m), weight 74.8 kg (165 lb), SpO2 93 %. ,Body mass index is 31.18 kg/m². Gen:  Pleasant, non-tachypnic, non-dyspnic. Conversant. Heart: regular rate and rhythm, S1S2 present, no murmur, rub or gallop  Lungs: clear to ausculatation bilaterally. No wheezing, crackless, or rhonchi. No accessory muscle use or respiratory distress. Abd: soft, non-tender, non-distended. NABS, no guarding, rebound or peritoneal signs. Extremities: no clubbing, cyanosis or edema. 2+pedal pulses bilaterally. Full range of motion  Neuro: awake, alert and oriented. Cranial nerves 2-12 intact. Strength and sensation grossly intact. Skin: warm and dry: no petechiae, purpura and rash.     LABS:   Results from last 7 days   Lab Units 07/23/23  0543 07/21/23  0603 07/20/23  2227   WBC Thousand/uL 9.18 13.41* 17.11*   HEMOGLOBIN g/dL 11.9 12.4 13.9   HEMATOCRIT % 35.8 36.8 40.3   PLATELETS Thousands/uL 200 166 229     Results from last 7 days   Lab Units 07/23/23  0543 07/21/23  0603 07/20/23  2227   POTASSIUM mmol/L 4.0 3.5 3.8   CHLORIDE mmol/L 110* 109* 102   CO2 mmol/L 24 22 24   BUN mg/dL 9 8 9   CREATININE mg/dL 0.69 0.66 0.80   CALCIUM mg/dL 8.2* 8.0* 9.8       Intake/Output Summary (Last 24 hours) at 7/23/2023 1006  Last data filed at 7/23/2023 9605  Gross per 24 hour   Intake 1185 ml   Output --   Net 1185 ml         Current Facility-Administered Medications   Medication Dose Route Frequency Provider Last Rate   • acetaminophen 650 mg Oral Q6H PRN Ondina Ariza MD     • cefTRIAXone  1,000 mg Intravenous Q24H Ondina Ariza MD 1,000 mg (07/22/23 5579)   • enoxaparin  40 mg Subcutaneous Q24H [address] Noe Hanks MD     • famotidine  20 mg Oral BID PRN Luma Clay DO     • HYDROmorphone  0.5 mg Intravenous Q4H PRN Ondina Ariza MD     • ondansetron  4 mg Intravenous Q6H PRN Ondina Ariza MD     • oxybutynin  10 mg Oral Daily Lawerance Dire, CRNP     • oxyCODONE  10 mg Oral Q4H PRN Lawerance Dire, CRNP     • oxyCODONE  5 mg Oral Q4H PRN Lawerance Dire, CRNP         Family update- in the room-updated

## 2023-07-23 NOTE — ASSESSMENT & PLAN NOTE
· POA a/e/b tachycardia and leukocytosis  · Tmax 100.4F  · Source: multifocal acute pyelonephritis with bilateral ureteral calculi and bilateral hydroureter   · Bilateral - CYSTOSCOPY RETROGRADE PYELOGRAM WITH INSERTION STENT URETERAL bilateral pod 2  · Doing well. Afebrile. WBC normalized. Urine culture growing E. coli susceptible to cefazolin. Will transition to Keflex 500 mg 3 times daily for 5 days and have her follow-up with her urologist in [place]   · Stable for discharge

## 2023-07-23 NOTE — DISCHARGE SUMMARY
Discharge Summary - Medical Mandi Zepeda 36 y.o. female MRN: [id number]    [address]  Room / Bed: W [address] [facility] 436/W -01 Encounter: [id number]    BRIEF OVERVIEW      Admission Date: 7/21/2023       Discharge Date: 7/23/2023      Admitting Diagnosis: Sepsis secondary to acute pyelonephritis with bilateral ureteral calculi and bilateral hydroureter    Primary Discharge Diagnosis  Principal Problem (Resolved):    Sepsis ([address])  Active Problems:    Ureteral calculus  Resolved Problems:    Pyelonephritis      Service:  [facility] Internal Medicine    Consulting Providers   Neurology Dr. Adore Jacobo    Procedures Performed      [facility]        Discharge Condition: Improved    Discharge Disposition: Home/Self Care    Discharge summary:   Assessment/Plan   * Sepsis ([address])  Assessment & Plan  • POA a/e/b tachycardia and leukocytosis  • Tmax 100.4F  • Source: multifocal acute pyelonephritis with bilateral ureteral calculi and bilateral hydroureter   • Bilateral - CYSTOSCOPY RETROGRADE PYELOGRAM WITH INSERTION STENT URETERAL bilateral pod 2  • Doing well. Afebrile. WBC normalized. Urine culture growing E. coli susceptible to cefazolin. Will transition to Keflex 500 mg 3 times daily for 5 days and have her follow-up with her urologist in [place] • Stable for discharge     Pyelonephritis  Assessment & Plan  • 48-year-old female with a history of recurrent nephrolithiasis presented to the ER with bilateral flank pain, fever chills frequency and urgency. • CT A/P showed 3 mm ureteral calculus within the proximal right ureter with questionable punctate calculus in the left proximal ureter. Associated mild bilateral hydroureter without significant hydronephrosis. Multifocal acute pyelonephritis.    • Treated for sepsis  • She was evaluated by urology and underwent cystoscopy with bilateral retrograde pyelography and insertion of bilateral ureteral stents. • Remained stable since then. POD day 2. No fever, WBC has normalized. Renal functions normal  • Urine culture growing E. coli sensitive to cefazolin. • Switch to Keflex upon discharge for a 5-day course. • We will follow-up with her own urologist in [place]     Ureteral calculus  Assessment & Plan  • Bilateral ureteral stones, 3mm proximal right ureter and questionable punctate calculus in left proximal ureter with bilateral hydroureter and superimposed UTI and multifocal acute pyelo     • S/p bilateral ureteral stents. Patient states she is passing pieces of stones and is feeling much better. No fever, leukocytosis has resolved. Had some bladder spasms last night but otherwise doing okay.   Milan Whiting to be discharged home           Discharge Medications   Please see Medical Reconciliation Discharge Form    Discharge Follow Up Appointments:   PCP  Neurology    Discharge  Statement   Total Time Spent today including physical exam, discussion with patient and family, and discharge arrangements/care 35 minutes.

## 2023-07-23 NOTE — PLAN OF CARE
Problem: PAIN - ADULT  Goal: Verbalizes/displays adequate comfort level or baseline comfort level  Description: Interventions:  - Encourage patient to monitor pain and request assistance  - Assess pain using appropriate pain scale  - Administer analgesics based on type and severity of pain and evaluate response  - Implement non-pharmacological measures as appropriate and evaluate response  - Consider cultural and social influences on pain and pain management  - Notify physician/advanced practitioner if interventions unsuccessful or patient reports new pain  Outcome: Progressing     Problem: INFECTION - ADULT  Goal: Absence or prevention of progression during hospitalization  Description: INTERVENTIONS:  - Assess and monitor for signs and symptoms of infection  - Monitor lab/diagnostic results  - Monitor all insertion sites, i.e. indwelling lines, tubes, and drains  - Monitor endotracheal if appropriate and nasal secretions for changes in amount and color  - [place] appropriate cooling/warming therapies per order  - Administer medications as ordered  - Instruct and encourage patient and family to use good hand hygiene technique  - Identify and instruct in appropriate isolation precautions for identified infection/condition  Outcome: Progressing     Problem: SAFETY ADULT  Goal: Patient will remain free of falls  Description: INTERVENTIONS:  - Educate patient/family on patient safety including physical limitations  - Instruct patient to call for assistance with activity   - Consult OT/PT to assist with strengthening/mobility   - Keep Call bell within reach  - Keep bed low and locked with side rails adjusted as appropriate  - Keep care items and personal belongings within reach  - Initiate and maintain comfort rounds  - Make Fall Risk Sign visible to staff  - Offer Toileting every  Hours, in advance of need  - Initiate/Maintain alarm  - Obtain necessary fall risk management equipment:   - Apply yellow socks and bracelet for high fall risk patients  - Consider moving patient to room near nurses station  Outcome: Progressing  Goal: Maintain or return to baseline ADL function  Description: INTERVENTIONS:  -  Assess patient's ability to carry out ADLs; assess patient's baseline for ADL function and identify physical deficits which impact ability to perform ADLs (bathing, care of mouth/teeth, toileting, grooming, dressing, etc.)  - Assess/evaluate cause of self-care deficits   - Assess range of motion  - Assess patient's mobility; develop plan if impaired  - Assess patient's need for assistive devices and provide as appropriate  - Encourage maximum independence but intervene and supervise when necessary  - Involve family in performance of ADLs  - Assess for home care needs following discharge   - Consider OT consult to assist with ADL evaluation and planning for discharge  - Provide patient education as appropriate  Outcome: Progressing  Goal: Maintains/Returns to pre admission functional level  Description: INTERVENTIONS:  - Perform BMAT or MOVE assessment daily.   - Set and communicate daily mobility goal to care team and patient/family/caregiver. - Collaborate with rehabilitation services on mobility goals if consulted  - Perform Range of Motion  times a day. - Reposition patient every hours.   - Dangle patient  times a day  - Stand patient  times a day  - Ambulate patient  times a day  - Out of bed to chair  times a day   - Out of bed for meals  times a day  - Out of bed for toileting  - Record patient progress and toleration of activity level   Outcome: Progressing     Problem: GENITOURINARY - ADULT  Goal: Maintains or returns to baseline urinary function  Description: INTERVENTIONS:  - Assess urinary function  - Encourage oral fluids to ensure adequate hydration if ordered  - Administer IV fluids as ordered to ensure adequate hydration  - Administer ordered medications as needed  - Offer frequent toileting  - Follow urinary retention protocol if ordered  Outcome: Progressing  Goal: Absence of urinary retention  Description: INTERVENTIONS:  - Assess patient’s ability to void and empty bladder  - Monitor I/O  - Bladder scan as needed  - Discuss with physician/AP medications to alleviate retention as needed  - Discuss catheterization for long term situations as appropriate  Outcome: Progressing

## 2023-07-23 NOTE — UTILIZATION REVIEW
NOTIFICATION OF INPATIENT ADMISSION   AUTHORIZATION REQUEST   SERVICING FACILITY:   [address] [place] [address]  Tax ID: [id number]  NPI: [id number]   ATTENDING PROVIDER:  Attending Name and NPI#: Venkata SoteroMatheus8 [address] [place] [address] [[id number]]  Address: [address] [place] [address]  Phone: [phone]     ADMISSION INFORMATION:  Place of Service: Inpatient [address]  Place of Service Code: 21  Inpatient Admission Date/Time: 7/21/23 10:23 AM  Discharge Date/Time: 7/23/2023 11:53 AM  Admitting Diagnosis Code/Description:  Sepsis     UTILIZATION REVIEW CONTACT:  Linda Negron Utilization   Network Utilization Review Department  Phone: [phone]  Fax: [phone]  Email: [email or web address]  Contact for approvals/pending authorizations, clinical reviews, and discharge. PHYSICIAN ADVISORY SERVICES:  Medical Necessity Denial & Batt[employer] Review  Phone: [phone]  Fax: [phone]  Email: [email or web address]           Initial Clinical Review     OBSERVATION 7/21 0420 CHANGED TO INPATIENT 7/21 1023 DUE TO SEPSIS REQUIRING IV ANTIBIOTICS, IV FLUIDS     Admission: Date/Time/Statement:   Admission Orders (From admission, onward)     Ordered        07/21/23 1023  Inpatient Admission  Once            07/21/23 0420  Place in Observation  Once                      Orders Placed This Encounter   Procedures   • Place in Observation     Standing Status:   Standing     Number of Occurrences:   1     Order Specific Question:   Level of Care     Answer:   Med Surg [16]   • Inpatient Admission     Standing Status:   Standing     Number of Occurrences:   1     Order Specific Question:   Level of Care     Answer:   Med Surg [16]     Order Specific Question:   Estimated length of stay     Answer:   More than 2 Midnights     Order Specific Question:   Certification     Answer:   I certify that inpatient services are medically necessary for this patient for a duration of greater than two midnights. See H&P and MD Progress Notes for additional information about the patient's course of treatment. Initial Presentation: 36 y.o. female [facility] via EMS as transfer from [facility] ED for higher level of care. Pt presented to [address] [facility] ED 7/20 for worsening bilat flank pain, fevers and chills. CT AP revealed 3 mm right ureteral calculus, questionable punctate calculus left ureter, bilateral hydronephrosis, multifocal acute pyelonephritis. WBC elevated. UA appears grossly infected Transfer to [facility] for urgent bilateral renal decompression. Exam notes T max 100.7, + tachycardia, cap refill 2-3 secs, + rigors, + nausea. Admitted as  Inpatient for Sepsis, ureteral calculus , pyelonephritis. Plan IV Ceftriaxone, urine and bood cultures obtained, NPO, IVF, for OR with Urology     7/21 Urology consult history of recurrent lithiasis presented to emergency room with bilateral flank pain fevers chills frequency and urgency. White count was 17.1 creatinine was 0.8. meets sepsis criteria based on tachycardia leukocytosis with likely pyelonephritis. .  CT scan showed bilateral hydronephrosis with 3 mm right proximal ureteral stone and a possible punctate left proximal ureteral stone. Plan bilateral stent placement, bilateral ureteroscopy with laser lithotripsy at a later date after infection resolved.         OR 7/21    ANesthesia : General   Procedure:   Bilateral - CYSTOSCOPY RETROGRADE PYELOGRAM WITH INSERTION STENT URETERAL bilateral     Operative Findings:  Bladder had injected appearance of mucosa consistent with cystitis. Right retrograde pyelogram did not show obvious filling defect and showed mild hydroureteronephrosis. Stent placed without pus  Left side showed mild hydronephrosis without filling defect. Stent placed without pus.       ED Triage Vitals   Temperature Pulse Respirations Blood Pressure SpO2   07/21/23 0407 07/21/23 0457 07/21/23 0645 07/21/23 0407 07/21/23 0457   98 °F (36.7 °C) (!) 122 16 117/74 97 %      Temp Source Heart Rate Source Patient Position - Orthostatic VS BP Location FiO2 (%)   07/21/23 0645 07/21/23 0645 -- -- --   Temporal Monitor         Pain Score       07/21/23 0500       No Pain          Wt Readings from Last 1 Encounters:   07/21/23 74.8 kg (165 lb)     Additional Vital Signs:     Date/Time Temp Pulse Resp BP MAP (mmHg) SpO2 O2 Device   07/21/23 10:47:46 -- 78 -- 118/71 87 94 % --   07/21/23 10:18:45 97.9 °F (36.6 °C) 81 -- -- -- 97 % --   07/21/23 08:42:53 98.2 °F (36.8 °C) 100 16 112/76 88 93 % --   07/21/23 0822 97 °F (36.1 °C) Abnormal  100 20 110/60 -- -- --   07/21/23 0815 97 °F (36.1 °C) Abnormal  104 21 102/56 74 98 % --   07/21/23 0800 -- 114 Abnormal  24 Abnormal  104/57 74 95 % --   07/21/23 0758 97 °F (36.1 °C) Abnormal  -- 20 100/56 71 -- --   07/21/23 0645 100.7 °F (38.2 °C) Abnormal  107 Abnormal  16 112/55 -- 97 % None (Room air)   07/21/23 04:57:25 99.9 °F (37.7 °C) 122 Abnormal  -- -- -- 97 % --       Pertinent Labs/Diagnostic Test Results:   FL retrograde pyelogram   Final Result by Kathy Khan MD (07/21 1702)      Fluoroscopic guidance provided for bilateral retrograde pyelogram. Please see procedure report for further details.             Workstation performed: [id number]               Results from last 7 days   Lab Units 07/23/23  0543 07/21/23  0603 07/20/23  2227   WBC Thousand/uL 9.18 13.41* 17.11*   HEMOGLOBIN g/dL 11.9 12.4 13.9   HEMATOCRIT % 35.8 36.8 40.3   PLATELETS Thousands/uL 200 166 229   NEUTROS ABS Thousands/µL  --  12.29* 14.76*         Results from last 7 days   Lab Units 07/23/23  0543 07/21/23  0603 07/20/23  2227   SODIUM mmol/L 140 138 133*   POTASSIUM mmol/L 4.0 3.5 3.8   CHLORIDE mmol/L 110* 109* 102   CO2 mmol/L 24 22 24   ANION GAP mmol/L 6 7 7   BUN mg/dL 9 8 9   CREATININE mg/dL 0.69 0.66 0.80   EGFR ml/min/1.73sq m 106 108 90   CALCIUM mg/dL 8.2* 8.0* 9.8   MAGNESIUM mg/dL  --   --  1.6*     Results from last 7 days   Lab Units 07/20/23 2227   AST U/L 42*   ALT U/L 35   ALK PHOS U/L 51   TOTAL PROTEIN g/dL 7.3   ALBUMIN g/dL 4.2   TOTAL BILIRUBIN mg/dL 1.10*   BILIRUBIN DIRECT mg/dL 0.23*         Results from last 7 days   Lab Units 07/23/23  0543 07/21/23  0603 07/20/23 2227   GLUCOSE RANDOM mg/dL 88 118 126             Results from last 7 days   Lab Units 07/20/23 2227   PROCALCITONIN ng/ml 0.23     Results from last 7 days   Lab Units 07/20/23 2227   LACTIC ACID mmol/L 0.6           Results from last 7 days   Lab Units 07/20/23 2227   LIPASE u/L 7*                 Results from last 7 days   Lab Units 07/20/23  2305   CLARITY UA  Extra Turbid   COLOR UA  Yellow   SPEC GRAV UA  1.015   PH UA  6.0   GLUCOSE UA mg/dl Negative   KETONES UA mg/dl Negative   BLOOD UA  Large*   PROTEIN UA mg/dl 100 (2+)*   NITRITE UA  Positive*   BILIRUBIN UA  Negative   UROBILINOGEN UA (BE) mg/dl <2.0   LEUKOCYTES UA  Large*   WBC UA /hpf Innumerable*   RBC UA /hpf 30-50*   BACTERIA UA /hpf Innumerable*   EPITHELIAL CELLS WET PREP /hpf Occasional   MUCUS THREADS  Occasional*                                 Results from last 7 days   Lab Units 07/20/23  2305 07/20/23  2230 07/20/23 2227   BLOOD CULTURE   --  No Growth at 48 hrs. No Growth at 48 hrs.    URINE CULTURE  >100,000 cfu/ml Escherichia coli*  <10,000 cfu/ml  --   --                        Past Medical History:   Diagnosis Date   • Hydradenitis          Admitting Diagnosis: Sepsis  Age/Sex: 36 y.o. female  Admission Orders:  Scheduled Medications:  cefTRIAXone, 1,000 mg, Intravenous, Q24H  enoxaparin, 40 mg, Subcutaneous, Q24H [address]  oxybutynin, 10 mg, Oral, Daily      Continuous IV Infusions:    sodium chloride, 125 mL/hr, Intravenous, Continuous      PRN Meds:  acetaminophen, 650 mg, Oral, Q6H PRN  famotidine, 20 mg, Oral, BID PRN  HYDROmorphone, 0.5 mg, Intravenous, Q4H PRN  ondansetron, 4 mg, Intravenous, Q6H PRN  oxyCODONE, 10 mg, Oral, Q4H PRN  oxyCODONE, 5 mg, Oral, Q4H PRN       SCD's    Post op regular diet     IP CONSULT TO UROLOGY    Network Utilization Review Department  ATTENTION: Please call with any questions or concerns to [phone] and carefully listen to the prompts so that you are directed to the right person. All voicemails are confidential.  Serjio De La Rosa all requests for admission clinical reviews, approved or denied determinations and any other requests to dedicated fax number below belonging to the campus where the patient is receiving treatment.  List of dedicated fax numbers for the Facilities:  [place] DENIALS (Administrative/Medical Necessity) [phone] [address] (Maternity/NICU/Pediatrics) [phone]   [address] [place] [address] [phone]   [facility] [address] [facility] [phone] [address] [phone]   [address] [address] [address] [phone]

## 2023-07-23 NOTE — ASSESSMENT & PLAN NOTE
· 48-year-old female with a history of recurrent nephrolithiasis presented to the ER with bilateral flank pain, fever chills frequency and urgency. · CT A/P showed 3 mm ureteral calculus within the proximal right ureter with questionable punctate calculus in the left proximal ureter. Associated mild bilateral hydroureter without significant hydronephrosis. Multifocal acute pyelonephritis. · Treated for sepsis  · She was evaluated by urology and underwent cystoscopy with bilateral retrograde pyelography and insertion of bilateral ureteral stents. · Remained stable since then. POD day 2. No fever, WBC has normalized. · Urine culture growing E. coli sensitive to cefazolin. · Switch to Keflex upon discharge for a 5-day course. · We will follow-up with her own urologist in [place]

## 2023-07-24 NOTE — UTILIZATION REVIEW
NOTIFICATION OF ADMISSION DISCHARGE   This is a Notification of Discharge from [address] [place] [address]. Please be advised that this patient has been discharge from our facility. Below you will find the admission and discharge date and time including the patient’s disposition. UTILIZATION REVIEW CONTACT:  Gurinder Ventura MA  Utilization   Network Utilization Review Department  Phone: [phone] x carefully listen to the prompts. All voicemails are confidential.  Email: [email or web address]     ADMISSION INFORMATION  PRESENTATION DATE: 7/21/2023  4:00 AM  OBERVATION ADMISSION DATE:   INPATIENT ADMISSION DATE: 7/21/23 10:23 AM   DISCHARGE DATE: 7/23/2023 11:53 AM   DISPOSITION:Home/Self Care    IMPORTANT INFORMATION:  Send all requests for admission clinical reviews, approved or denied determinations and any other requests to dedicated fax number below belonging to the campus where the patient is receiving treatment.  List of dedicated fax numbers:  [place] DENIALS (Administrative/Medical Necessity) [phone] [address] [facility] (Maternity/NICU/Pediatrics) [phone]   [facility] [phone]   [facility] [phone] [address] [phone]   [address] [place] [address] [phone]   [facility] [phone]   [address] [place] [address] [address] [facility] [phone]   [address] [place] [address] [phone]   [address] [facility] [phone]   [facility] [facility] [address] [facility] [phone]

## 2023-07-25 NOTE — TELEPHONE ENCOUNTER
-Attempted to contact pt, left message on phone, that I am trying to reach her to schedule her procedure w/Dr Jacob Rogers. Please call the office at [phone] and ask for me.

## 2023-07-26 LAB
BACTERIA BLD CULT: NORMAL
BACTERIA BLD CULT: NORMAL

## 2023-07-28 NOTE — TELEPHONE ENCOUNTER
Spoke to pt to sched her surgical procedure, she said she no longer needs it that she is back home in [place]

## 2023-08-12 DIAGNOSIS — N12 PYELONEPHRITIS: ICD-10-CM

## 2023-08-12 DIAGNOSIS — N20.1 URETERAL CALCULUS: ICD-10-CM

## 2023-08-14 RX ORDER — OXYBUTYNIN CHLORIDE 10 MG/1
10 TABLET, EXTENDED RELEASE ORAL DAILY
Qty: 84 TABLET | Refills: 1 | OUTPATIENT
Start: 2023-08-14 | End: 2023-09-11

## 2023-08-26 DIAGNOSIS — N20.1 URETERAL CALCULUS: ICD-10-CM

## 2023-08-26 DIAGNOSIS — N12 PYELONEPHRITIS: ICD-10-CM

## 2023-08-28 RX ORDER — OXYBUTYNIN CHLORIDE 10 MG/1
10 TABLET, EXTENDED RELEASE ORAL DAILY
Qty: 28 TABLET | Refills: 0 | OUTPATIENT
Start: 2023-08-28 | End: 2023-09-25

## (undated) DEVICE — CATH URETERAL 5FR X 70 CM FLEX TIP POLYUR BARD

## (undated) DEVICE — CHLORHEXIDINE 4PCT 4 OZ

## (undated) DEVICE — PREMIUM DRY TRAY LF: Brand: MEDLINE INDUSTRIES, INC.

## (undated) DEVICE — STERILE SURGICAL LUBRICANT,  TUBE: Brand: SURGILUBE

## (undated) DEVICE — INVIEW CLEAR LEGGINGS: Brand: CONVERTORS

## (undated) DEVICE — PACK TUR

## (undated) DEVICE — SPECIMEN CONTAINER STERILE PEEL PACK

## (undated) DEVICE — UROLOGIC DRAIN BAG: Brand: UNBRANDED

## (undated) DEVICE — GLOVE SRG BIOGEL 7.5

## (undated) DEVICE — GUIDEWIRE STRGHT TIP 0.035 IN  SOLO PLUS